# Patient Record
Sex: MALE | Race: OTHER | HISPANIC OR LATINO | Employment: UNEMPLOYED | URBAN - NONMETROPOLITAN AREA
[De-identification: names, ages, dates, MRNs, and addresses within clinical notes are randomized per-mention and may not be internally consistent; named-entity substitution may affect disease eponyms.]

---

## 2018-09-21 ENCOUNTER — HOSPITAL ENCOUNTER (EMERGENCY)
Facility: HOSPITAL | Age: 38
Discharge: HOME | End: 2018-09-21
Attending: EMERGENCY MEDICINE | Admitting: EMERGENCY MEDICINE
Payer: COMMERCIAL

## 2018-09-21 ENCOUNTER — APPOINTMENT (EMERGENCY)
Dept: RADIOLOGY | Facility: HOSPITAL | Age: 38
End: 2018-09-21
Payer: COMMERCIAL

## 2018-09-21 VITALS
OXYGEN SATURATION: 100 % | RESPIRATION RATE: 18 BRPM | SYSTOLIC BLOOD PRESSURE: 137 MMHG | HEART RATE: 72 BPM | DIASTOLIC BLOOD PRESSURE: 94 MMHG | TEMPERATURE: 98.6 F | WEIGHT: 252.43 LBS

## 2018-09-21 DIAGNOSIS — S80.12XA CONTUSION OF LEFT LOWER LEG: Primary | ICD-10-CM

## 2018-09-21 DIAGNOSIS — S81.812A LACERATION OF LEFT LOWER LEG, INITIAL ENCOUNTER: ICD-10-CM

## 2018-09-21 DIAGNOSIS — T07.XXXA MULTIPLE ABRASIONS: ICD-10-CM

## 2018-09-21 PROCEDURE — 99283 EMERGENCY DEPT VISIT LOW MDM: CPT

## 2018-09-21 PROCEDURE — 90715 TDAP VACCINE 7 YRS/> IM: CPT | Performed by: EMERGENCY MEDICINE

## 2018-09-21 PROCEDURE — 90471 IMMUNIZATION ADMIN: CPT

## 2018-09-21 PROCEDURE — 73590 X-RAY EXAM OF LOWER LEG: CPT

## 2018-09-21 RX ORDER — GINSENG 100 MG
1 CAPSULE ORAL 2 TIMES DAILY
Status: DISCONTINUED | OUTPATIENT
Start: 2018-09-21 | End: 2018-09-22 | Stop reason: HOSPADM

## 2018-09-21 RX ORDER — CEPHALEXIN 250 MG/1
1000 CAPSULE ORAL ONCE
Status: COMPLETED | OUTPATIENT
Start: 2018-09-21 | End: 2018-09-21

## 2018-09-21 RX ORDER — CEPHALEXIN 500 MG/1
500 CAPSULE ORAL EVERY 6 HOURS SCHEDULED
Qty: 20 CAPSULE | Refills: 0 | Status: SHIPPED | OUTPATIENT
Start: 2018-09-21 | End: 2018-09-26

## 2018-09-21 RX ORDER — LIDOCAINE HYDROCHLORIDE 10 MG/ML
5 INJECTION, SOLUTION EPIDURAL; INFILTRATION; INTRACAUDAL; PERINEURAL ONCE
Status: COMPLETED | OUTPATIENT
Start: 2018-09-21 | End: 2018-09-21

## 2018-09-21 RX ADMIN — BACITRACIN ZINC 1 SMALL APPLICATION: 500 OINTMENT TOPICAL at 22:31

## 2018-09-21 RX ADMIN — TETANUS TOXOID, REDUCED DIPHTHERIA TOXOID AND ACELLULAR PERTUSSIS VACCINE, ADSORBED 0.5 ML: 5; 2.5; 8; 8; 2.5 SUSPENSION INTRAMUSCULAR at 21:41

## 2018-09-21 RX ADMIN — LIDOCAINE HYDROCHLORIDE 5 ML: 10 INJECTION, SOLUTION EPIDURAL; INFILTRATION; INTRACAUDAL; PERINEURAL at 21:41

## 2018-09-21 RX ADMIN — CEPHALEXIN 1000 MG: 250 CAPSULE ORAL at 21:40

## 2018-09-22 NOTE — DISCHARGE INSTRUCTIONS
ELEVATE LEG NEXT 24 HRS AS MUCH AS POSSIBLE  KEEP WOUNDS CLEAN AND DRY  USE MOTRIN AS NEEDED FOR PAIN  IF ANY SIGN OF INFECTION BE SEEN RIGHT AWAY- RETURN 10 DAYS FOR SUTURE REMOVAL        Contusion in Adults   WHAT YOU NEED TO KNOW:   A contusion is a bruise that appears on your skin after an injury  A bruise happens when small blood vessels tear but skin does not  When blood vessels tear, blood leaks into nearby tissue, such as soft tissue or muscle  DISCHARGE INSTRUCTIONS:   Seek care immediately if:   · You have new trouble moving the injured area  · You have tingling or numbness in or near the injured area  · Your hand or foot below the bruise gets cold or turns pale  Contact your healthcare provider if:   · You find a new lump in the injured area  · Your symptoms do not improve with treatment after 4 to 5 days  · You have questions or concerns about your condition or care  Medicines: You may need any of the following:  · NSAIDs , such as ibuprofen, help decrease swelling, pain, and fever  This medicine is available with or without a doctor's order  NSAIDs can cause stomach bleeding or kidney problems in certain people  If you take blood thinner medicine, always ask your healthcare provider if NSAIDs are safe for you  Always read the medicine label and follow directions  · Prescription pain medicine  may be given  Do not wait until the pain is severe before you take your medicine  · Take your medicine as directed  Contact your healthcare provider if you think your medicine is not helping or if you have side effects  Tell him or her if you are allergic to any medicine  Keep a list of the medicines, vitamins, and herbs you take  Include the amounts, and when and why you take them  Bring the list or the pill bottles to follow-up visits  Carry your medicine list with you in case of an emergency  Follow up with your healthcare provider as directed:   You may need to return within a week to check your injury again  Write down your questions so you remember to ask them during your visits  Help a contusion heal:   · Rest the injured area  or use it less than usual  If you bruised your leg or foot, you may need crutches or a cane to help you walk  This will help you keep weight off your injured body part  · Apply ice  to decrease swelling and pain  Ice may also help prevent tissue damage  Use an ice pack, or put crushed ice in a plastic bag  Cover it with a towel and place it on your bruise for 15 to 20 minutes every hour or as directed  · Use compression  to support the area and decrease swelling  Wrap an elastic bandage around the area over the bruised muscle  Make sure the bandage is not too tight  You should be able to fit 1 finger between the bandage and your skin  · Elevate (raise) your injured body part  above the level of your heart to help decrease pain and swelling  Use pillows, blankets, or rolled towels to elevate the area as often as you can  · Do not drink alcohol  as directed  Alcohol may slow healing  · Do not stretch injured muscles  right after your injury  Ask your healthcare provider when and how you may safely stretch after your injury  Gentle stretches can help increase your flexibility  · Do not massage the area or put heating pads  on the bruise right after your injury  Heat and massage may slow healing  Your healthcare provider may tell you to apply heat after several days  At that time, heat will start to help the injury heal   Prevent another contusion:   · Stretch and warm up before you play sports or exercise  · Wear protective gear when you play sports  Examples are shin guards and padding  · If you begin a new physical activity, start slowly to give your body a chance to adjust   © 2017 [employer] Information is for End User's use only and may not be sold, redistributed or otherwise used for commercial purposes   All illustrations and images included in CareNotes® are the copyrighted property of [employer]  or Billy Johnson  The above information is an  only  It is not intended as medical advice for individual conditions or treatments  Talk to your doctor, nurse or pharmacist before following any medical regimen to see if it is safe and effective for you  Laceration   WHAT YOU NEED TO KNOW:   A laceration is an injury to the skin and the soft tissue underneath it  Lacerations happen when you are cut or hit by something  They can happen anywhere on the body  DISCHARGE INSTRUCTIONS:   Seek care immediately if:   · You have heavy bleeding or bleeding that does not stop after 10 minutes of holding firm, direct pressure over the wound  · Your wound opens up  Contact your healthcare provider if:   · You have a fever or chills  · Your laceration is red, warm, or swollen  · You have red streaks on your skin coming from your wound  · You have white or yellow drainage from the wound that smells bad  · You have pain that gets worse, even after treatment  · You have questions or concerns about your condition or care  Medicines:   · Prescription pain medicine  may be given  Ask how to take this medicine safely  · Antibiotics  help treat or prevent a bacterial infection  · Take your medicine as directed  Contact your healthcare provider if you think your medicine is not helping or if you have side effects  Tell him or her if you are allergic to any medicine  Keep a list of the medicines, vitamins, and herbs you take  Include the amounts, and when and why you take them  Bring the list or the pill bottles to follow-up visits  Carry your medicine list with you in case of an emergency  Care for your wound as directed:   · Do not get your wound wet  until your healthcare provider says it is okay  Do not soak your wound in water   Do not go swimming until your healthcare provider says it is okay  Carefully wash the wound with soap and water  Gently pat the area dry or allow it to air dry  · Change your bandages  when they get wet, dirty, or after washing  Apply new, clean bandages as directed  Do not apply elastic bandages or tape too tight  Do not put powders or lotions over your incision  · Apply antibiotic ointment as directed  Your healthcare provider may give you antibiotic ointment to put over your wound if you have stitches  If you have strips of tape over your incision, let them dry up and fall off on their own  If they do not fall off within 14 days, gently remove them  If you have glue over your wound, do not remove or pick at it  If your glue comes off, do not replace it with glue that you have at home  · Check your wound every day for signs of infection such as swelling, redness, or pus  Self-care:   · Apply ice  on your wound for 15 to 20 minutes every hour or as directed  Use an ice pack, or put crushed ice in a plastic bag  Cover it with a towel  Ice helps prevent tissue damage and decreases swelling and pain  · Use a splint as directed  A splint will decrease movement and stress on your wound  It may help it heal faster  A splint may be used for lacerations over joints or areas of your body that bend  Ask your healthcare provider how to apply and remove a splint  · Decrease scarring of your wound  by applying ointments as directed  Do not apply ointments until your healthcare provider says it is okay  You may need to wait until your wound is healed  Ask which ointment to buy and how often to use it  After your wound is healed, use sunscreen over the area when you are out in the sun  You should do this for at least 6 months to 1 year after your injury  Follow up with your healthcare provider as directed: You will need to return in 3 to 14 days to have stitches or staples removed  Write down your questions so you remember to ask them during your visits    © 2017 [facility] Information is for End User's use only and may not be sold, redistributed or otherwise used for commercial purposes  All illustrations and images included in CareNotes® are the copyrighted property of [employer]  or Billy Johnson  The above information is an  only  It is not intended as medical advice for individual conditions or treatments  Talk to your doctor, nurse or pharmacist before following any medical regimen to see if it is safe and effective for you

## 2018-09-22 NOTE — ED PROVIDER NOTES
History  Chief Complaint   Patient presents with    Leg Injury     Pt was tilling yard and was struck by tiller left lower extremity - c/o pain and bleeding from wounds     Pt was using a tiller in garden and "got to close to his leg"  Blade hit left lower leg  Occurred approx 3:30 PM  Pt washed area after with H2O2 and soap and water   States "made to come to ER by wife"  Pt has pain anterior aspect lower leg and multiple laceration/abrasions  Pt denies numbness/tingling   No other area of injury/pain  History provided by:  Patient  Leg Pain   Location:  Leg  Leg location:  L lower leg  Pain details:     Quality:  Aching and throbbing    Radiates to:  Does not radiate    Timing:  Constant  Chronicity:  New  Tetanus status:  Out of date  Worsened by: Activity  Ineffective treatments:  None tried  Associated symptoms: no back pain, no decreased ROM, no fever, no itching, no muscle weakness, no neck pain, no numbness and no tingling    Risk factors: no concern for non-accidental trauma, no obesity and no recent illness        None       History reviewed  No pertinent past medical history  Past Surgical History:   Procedure Laterality Date    NO PAST SURGERIES         History reviewed  No pertinent family history  I have reviewed and agree with the history as documented  Social History   Substance Use Topics    Smoking status: Current Every Day Smoker     Types: Cigarettes    Smokeless tobacco: Never Used    Alcohol use Yes      Comment: rarely        Review of Systems   Constitutional: Negative for activity change, appetite change, chills, diaphoresis and fever  HENT: Negative  Negative for trouble swallowing and voice change  Eyes: Negative  Respiratory: Negative  Negative for chest tightness and shortness of breath  Cardiovascular: Negative  Negative for chest pain  Gastrointestinal: Negative  Negative for abdominal pain, nausea and vomiting  Genitourinary: Negative  Negative for difficulty urinating, flank pain and hematuria  Musculoskeletal: Negative for back pain, neck pain and neck stiffness  Skin: Positive for wound  Negative for itching  Neurological: Negative  Negative for dizziness, tremors, syncope, speech difficulty, light-headedness, numbness and headaches  Psychiatric/Behavioral: Negative  Negative for confusion, hallucinations and self-injury  The patient is not nervous/anxious  All other systems reviewed and are negative  Physical Exam  Physical Exam   Constitutional: He is oriented to person, place, and time  He appears well-developed and well-nourished  He is active and cooperative  Non-toxic appearance  He does not have a sickly appearance  He does not appear ill  No distress  HENT:   Head: Normocephalic and atraumatic  Mouth/Throat: Oropharynx is clear and moist  Mucous membranes are not dry and not cyanotic  No oropharyngeal exudate, posterior oropharyngeal edema or posterior oropharyngeal erythema  Eyes: Conjunctivae are normal  Pupils are equal, round, and reactive to light  Neck: Normal range of motion and phonation normal  Neck supple  No JVD present  No spinous process tenderness and no muscular tenderness present  Cardiovascular: Normal rate, regular rhythm, intact distal pulses and normal pulses  No extrasystoles are present  Pulmonary/Chest: Effort normal  No respiratory distress  He has no decreased breath sounds  He has no wheezes  He has no rhonchi  He has no rales  He exhibits no tenderness, no crepitus and no swelling  Abdominal: Soft  Bowel sounds are normal  There is no tenderness  There is no rigidity, no guarding and no CVA tenderness     Musculoskeletal:        Right hip: Normal         Left hip: Normal         Right knee: Normal         Left knee: Normal         Right ankle: Normal         Left ankle: Normal         Cervical back: Normal         Thoracic back: Normal         Lumbar back: Normal  Right upper leg: Normal         Left upper leg: Normal         Right lower leg: Normal         Left lower leg: He exhibits tenderness and laceration  He exhibits no deformity  Legs:       Right foot: Normal         Left foot: Normal    Neurological: He is alert and oriented to person, place, and time  He has normal strength and normal reflexes  No cranial nerve deficit  He displays a negative Romberg sign  GCS eye subscore is 4  GCS verbal subscore is 5  GCS motor subscore is 6  Skin: Skin is warm and dry  Abrasion and laceration noted  No petechiae and no rash noted  He is not diaphoretic  No cyanosis  No pallor  Multiple superficial lacerations and on deeper laceration in distal area  ( see pic)  Pt has tenderness of anterior lower leg  No calf tenderness   Psychiatric: He has a normal mood and affect  His speech is normal and behavior is normal  Thought content normal  Cognition and memory are normal    Vitals reviewed  Vital Signs  ED Triage Vitals [09/21/18 2034]   Temperature Pulse Respirations Blood Pressure SpO2   98 6 °F (37 °C) 72 18 137/94 100 %      Temp Source Heart Rate Source Patient Position - Orthostatic VS BP Location FiO2 (%)   Temporal Monitor Sitting Right arm --      Pain Score       4           Vitals:    09/21/18 2034   BP: 137/94   Pulse: 72   Patient Position - Orthostatic VS: Sitting       Visual Acuity      ED Medications  Medications   tetanus-diphtheria-acellular pertussis (BOOSTRIX) IM injection 0 5 mL (0 5 mL Intramuscular Given 9/21/18 2141)   cephalexin (KEFLEX) capsule 1,000 mg (1,000 mg Oral Given 9/21/18 2140)   lidocaine (PF) (XYLOCAINE-MPF) 1 % injection 5 mL (5 mL Infiltration Given by Other 9/21/18 2141)       Diagnostic Studies  Results Reviewed     None                 XR tibia fibula 2 views LEFT   Final Result by Marc Pope MD (09/22 9472)      Tiny amount of soft tissue gas suggested    No foreign body or fracture seen            Workstation performed: [id number]                    Procedures  Lac Repair  Date/Time: 9/21/2018 9:22 PM  Performed by: Kaity Metcalf  Authorized by: Kaity Metcalf   Consent: Verbal consent obtained  Risks and benefits: risks, benefits and alternatives were discussed  Consent given by: patient  Patient understanding: patient states understanding of the procedure being performed  Patient consent: the patient's understanding of the procedure matches consent given  Test results: test results available and properly labeled  Radiology Images displayed and confirmed  If images not available, report reviewed: imaging studies available  Required items: required blood products, implants, devices, and special equipment available  Patient identity confirmed: verbally with patient and arm band  Time out: Immediately prior to procedure a "time out" was called to verify the correct patient, procedure, equipment, support staff and site/side marked as required  Body area: lower extremity  Location details: left lower leg  Laceration length: 2 5 cm  Foreign bodies: no foreign bodies  Tendon involvement: none  Vascular damage: no  Anesthesia: local infiltration    Anesthesia:  Local Anesthetic: lidocaine 1% without epinephrine  Anesthetic total: 3 mL    Sedation:  Patient sedated: no    Wound Dehiscence:  Superficial Wound Dehiscence: simple closure      Procedure Details:  Preparation: Patient was prepped and draped in the usual sterile fashion    Irrigation solution: saline  Debridement: none  Degree of undermining: none  Skin closure: 4-0 nylon  Number of sutures: 2  Technique: simple  Approximation difficulty: simple  Dressing: antibiotic ointment  Patient tolerance: Patient tolerated the procedure well with no immediate complications             Phone Contacts  ED Phone Contact    ED Course                               [facility]  CritCare Time    Disposition  Final diagnoses:   Contusion of left lower leg   Multiple abrasions - left lower leg   Laceration of left lower leg, initial encounter     Time reflects when diagnosis was documented in both MDM as applicable and the Disposition within this note     Time User Action Codes Description Comment    9/21/2018 10:18 PM Calderon Welsh Add [S80 12XA] Contusion of left lower leg     9/21/2018 10:18 PM Calderon Welsh Add [T07  XXXA] Multiple abrasions     9/21/2018 10:19 PM Calderon Welsh Modify [T07  XXXA] Multiple abrasions left lower leg    9/21/2018 10:19 PM Calderon Welsh Add [N72 571T] Laceration of left lower leg, initial encounter       ED Disposition     ED Disposition Condition Comment    Discharge  [facility] discharge to home/self care  Condition at discharge: Stable        Follow-up Information     Follow up With Specialties Details Why Contact Info Additional Information    [facility] Emergency Department Emergency Medicine   [place] [id number] [phone] [place] ED, [address], [address]          There are no discharge medications for this patient  No discharge procedures on file      ED Provider  Electronically Signed by           Adeel Bruno DO  09/22/18 1802

## 2019-04-01 ENCOUNTER — OFFICE VISIT (OUTPATIENT)
Dept: FAMILY MEDICINE CLINIC | Facility: CLINIC | Age: 39
End: 2019-04-01
Payer: COMMERCIAL

## 2019-04-01 VITALS
HEIGHT: 70 IN | SYSTOLIC BLOOD PRESSURE: 128 MMHG | WEIGHT: 270 LBS | BODY MASS INDEX: 38.65 KG/M2 | DIASTOLIC BLOOD PRESSURE: 78 MMHG

## 2019-04-01 DIAGNOSIS — Z02.4 DRIVER'S PERMIT PE (PHYSICAL EXAMINATION): ICD-10-CM

## 2019-04-01 DIAGNOSIS — E66.01 SEVERE OBESITY (BMI 35.0-39.9) WITH COMORBIDITY (HCC): ICD-10-CM

## 2019-04-01 DIAGNOSIS — Z00.00 WELL ADULT EXAM: Primary | ICD-10-CM

## 2019-04-01 PROCEDURE — 99385 PREV VISIT NEW AGE 18-39: CPT | Performed by: PHYSICIAN ASSISTANT

## 2019-06-11 NOTE — ED NOTES
Irrigated and cleansed patient's wound with sterile water and gauze   Tolerated well      Lesa Bourne RN  09/21/18 3037 No

## 2021-06-09 ENCOUNTER — HOSPITAL ENCOUNTER (EMERGENCY)
Facility: HOSPITAL | Age: 41
Discharge: HOME | End: 2021-06-09
Attending: EMERGENCY MEDICINE | Admitting: EMERGENCY MEDICINE
Payer: COMMERCIAL

## 2021-06-09 VITALS
HEIGHT: 70 IN | WEIGHT: 285.5 LBS | BODY MASS INDEX: 40.87 KG/M2 | SYSTOLIC BLOOD PRESSURE: 146 MMHG | DIASTOLIC BLOOD PRESSURE: 84 MMHG | TEMPERATURE: 98.3 F | RESPIRATION RATE: 18 BRPM | OXYGEN SATURATION: 97 % | HEART RATE: 83 BPM

## 2021-06-09 DIAGNOSIS — L02.91 ABSCESS: Primary | ICD-10-CM

## 2021-06-09 PROCEDURE — 87205 SMEAR GRAM STAIN: CPT | Performed by: EMERGENCY MEDICINE

## 2021-06-09 PROCEDURE — 99284 EMERGENCY DEPT VISIT MOD MDM: CPT | Performed by: EMERGENCY MEDICINE

## 2021-06-09 PROCEDURE — 99283 EMERGENCY DEPT VISIT LOW MDM: CPT

## 2021-06-09 PROCEDURE — 10060 I&D ABSCESS SIMPLE/SINGLE: CPT | Performed by: EMERGENCY MEDICINE

## 2021-06-09 PROCEDURE — 87070 CULTURE OTHR SPECIMN AEROBIC: CPT | Performed by: EMERGENCY MEDICINE

## 2021-06-09 PROCEDURE — 96372 THER/PROPH/DIAG INJ SC/IM: CPT

## 2021-06-09 RX ORDER — LIDOCAINE HYDROCHLORIDE 10 MG/ML
5 INJECTION, SOLUTION EPIDURAL; INFILTRATION; INTRACAUDAL; PERINEURAL ONCE
Status: COMPLETED | OUTPATIENT
Start: 2021-06-09 | End: 2021-06-09

## 2021-06-09 RX ORDER — GINSENG 100 MG
1 CAPSULE ORAL ONCE
Status: COMPLETED | OUTPATIENT
Start: 2021-06-09 | End: 2021-06-09

## 2021-06-09 RX ORDER — SULFAMETHOXAZOLE AND TRIMETHOPRIM 800; 160 MG/1; MG/1
1 TABLET ORAL ONCE
Status: COMPLETED | OUTPATIENT
Start: 2021-06-09 | End: 2021-06-09

## 2021-06-09 RX ORDER — SULFAMETHOXAZOLE AND TRIMETHOPRIM 800; 160 MG/1; MG/1
1 TABLET ORAL 2 TIMES DAILY
Qty: 14 TABLET | Refills: 0 | Status: SHIPPED | OUTPATIENT
Start: 2021-06-09 | End: 2021-06-16

## 2021-06-09 RX ORDER — ACETAMINOPHEN 325 MG/1
650 TABLET ORAL ONCE
Status: COMPLETED | OUTPATIENT
Start: 2021-06-09 | End: 2021-06-09

## 2021-06-09 RX ORDER — KETOROLAC TROMETHAMINE 30 MG/ML
15 INJECTION, SOLUTION INTRAMUSCULAR; INTRAVENOUS ONCE
Status: COMPLETED | OUTPATIENT
Start: 2021-06-09 | End: 2021-06-09

## 2021-06-09 RX ADMIN — BACITRACIN 1 SMALL APPLICATION: 500 OINTMENT TOPICAL at 20:19

## 2021-06-09 RX ADMIN — KETOROLAC TROMETHAMINE 15 MG: 30 INJECTION, SOLUTION INTRAMUSCULAR; INTRAVENOUS at 20:18

## 2021-06-09 RX ADMIN — SULFAMETHOXAZOLE AND TRIMETHOPRIM 1 TABLET: 800; 160 TABLET ORAL at 20:19

## 2021-06-09 RX ADMIN — LIDOCAINE HYDROCHLORIDE 5 ML: 10 INJECTION, SOLUTION EPIDURAL; INFILTRATION; INTRACAUDAL; PERINEURAL at 19:57

## 2021-06-09 RX ADMIN — ACETAMINOPHEN 650 MG: 325 TABLET, FILM COATED ORAL at 20:18

## 2021-06-09 NOTE — ED PROVIDER NOTES
History  Chief Complaint   Patient presents with    Ear Problem     pt has a lump on the back of his left ear "for months" states that it recently has gotten larger     HPI  This is a 70-year-old male who comes in with swelling behind his left earlobe  Patient states he had some mild swelling there for a while  Over last couple days though it has started to get worse, it has become acutely swollen, and feels very fluctuant  Patient denies any purulence drainage from the wound  Otherwise denies any neck pain fevers chills sweats or any other concerns  None       History reviewed  No pertinent past medical history  Past Surgical History:   Procedure Laterality Date    NO PAST SURGERIES         Family History   Problem Relation Age of Onset    No Known Problems Mother      I have reviewed and agree with the history as documented  E-Cigarette/Vaping    E-Cigarette Use Never User      E-Cigarette/Vaping Substances     Social History     Tobacco Use    Smoking status: Current Every Day Smoker     Packs/day: 1 00     Types: Cigarettes    Smokeless tobacco: Never Used   Substance Use Topics    Alcohol use: Not Currently     Comment: rarely    Drug use: No       Review of Systems   Constitutional: Negative  Negative for chills and fever  HENT: Negative  Negative for ear pain and sore throat  Eyes: Negative  Negative for pain and discharge  Respiratory: Negative  Negative for chest tightness and shortness of breath  Cardiovascular: Negative  Negative for chest pain and palpitations  Gastrointestinal: Negative  Negative for abdominal pain, nausea and vomiting  Endocrine: Negative  Negative for polyphagia and polyuria  Genitourinary: Negative  Negative for dysuria and flank pain  Musculoskeletal: Negative  Negative for arthralgias and back pain  Skin: Positive for wound  Negative for color change  Allergic/Immunologic: Negative    Negative for food allergies and immunocompromised state  Neurological: Negative  Negative for weakness and headaches  Hematological: Negative  Negative for adenopathy  Does not bruise/bleed easily  Psychiatric/Behavioral: Negative  Negative for suicidal ideas  The patient is not nervous/anxious  Physical Exam  Physical Exam  Vitals signs and nursing note reviewed  Constitutional:       General: He is not in acute distress  Appearance: Normal appearance  He is not diaphoretic  HENT:      Head: Normocephalic and atraumatic  Right Ear: External ear normal       Left Ear: External ear normal       Nose: Nose normal  No congestion or rhinorrhea  Mouth/Throat:      Mouth: Mucous membranes are moist    Eyes:      General: No scleral icterus  Right eye: No discharge  Left eye: No discharge  Conjunctiva/sclera: Conjunctivae normal       Pupils: Pupils are equal, round, and reactive to light  Neck:      Musculoskeletal: Normal range of motion and neck supple  No neck rigidity  Cardiovascular:      Rate and Rhythm: Regular rhythm  Pulses: Normal pulses  Heart sounds: Normal heart sounds  Pulmonary:      Effort: Pulmonary effort is normal  No respiratory distress  Breath sounds: Normal breath sounds  No stridor  No wheezing, rhonchi or rales  Abdominal:      General: Abdomen is flat  There is no distension  Palpations: Abdomen is soft  Tenderness: There is no abdominal tenderness  There is no guarding  Musculoskeletal: Normal range of motion  General: No swelling, tenderness or deformity  Skin:     General: Skin is warm and dry  Capillary Refill: Capillary refill takes less than 2 seconds  Findings: No lesion or rash  Comments: Behind patient's left ear he does have swelling that is very fluctuant mildly tender to palpation  There is no obvious redness around it  Neurological:      General: No focal deficit present        Mental Status: He is alert and oriented to person, place, and time  Cranial Nerves: No cranial nerve deficit  Sensory: No sensory deficit  Psychiatric:         Mood and Affect: Mood normal          Behavior: Behavior normal          Thought Content: Thought content normal          Vital Signs  ED Triage Vitals [06/09/21 1944]   Temperature Pulse Respirations Blood Pressure SpO2   98 3 °F (36 8 °C) 83 18 146/84 97 %      Temp Source Heart Rate Source Patient Position - Orthostatic VS BP Location FiO2 (%)   Temporal Monitor Sitting Right arm --      Pain Score       5           Vitals:    06/09/21 1944   BP: 146/84   Pulse: 83   Patient Position - Orthostatic VS: Sitting         Visual Acuity      ED Medications  Medications   lidocaine (PF) (XYLOCAINE-MPF) 1 % injection 5 mL (5 mL Infiltration Given 6/9/21 1957)   bacitracin topical ointment 1 small application (1 small application Topical Given 6/9/21 2019)   sulfamethoxazole-trimethoprim (BACTRIM DS) 800-160 mg per tablet 1 tablet (1 tablet Oral Given 6/9/21 2019)   ketorolac (TORADOL) injection 15 mg (15 mg Intramuscular Given 6/9/21 2018)   acetaminophen (TYLENOL) tablet 650 mg (650 mg Oral Given 6/9/21 2018)       Diagnostic Studies  Results Reviewed     Procedure Component Value Units Date/Time    Wound culture and Gram stain [[id number]]  (Abnormal) Collected: 06/09/21 2024    Lab Status: Preliminary result Specimen: Wound from Head Updated: 06/11/21 1107     Wound Culture Culture results to follow       Gram Stain Result 2+ Polys      Rare Gram positive cocci in clusters      Rare Gram positive rods                 No orders to display              Procedures  Incision and drain    Date/Time: 6/9/2021 8:00 PM  Performed by: Niles Montenegro MD  Authorized by: Niles Montenegro MD   Universal Protocol:  Procedure performed by:  Consent given by: patient  Patient identity confirmed: verbally with patient      Patient location:  ED  Location:     Type:  Abscess    Size:  1 cm    Location:  Head/neck    Head/neck location: Left earlobe  Pre-procedure details:     Skin preparation:  Betadine  Anesthesia (see MAR for exact dosages): Anesthesia method:  Local infiltration    Local anesthetic:  Lidocaine 1% w/o epi  Procedure details:     Complexity:  Simple    Needle aspiration: no      Incision types:  Stab incision    Scalpel blade:  11    Approach:  Puncture    Incision depth:  Subcutaneous    Wound management:  Probed and deloculated    Drainage:  Purulent    Drainage amount: Moderate    Wound treatment:  Wound left open    Packing materials:  None  Post-procedure details:     Patient tolerance of procedure: Tolerated well, no immediate complications             ED Course      patient found have wound behind his left ear  It was very fluctuant touch  Did numb up with lidocaine without epi  Did do wound culture  Will discharge patient on Bactrim  MDM    Disposition  Final diagnoses:   Abscess     Time reflects when diagnosis was documented in both MDM as applicable and the Disposition within this note     Time User Action Codes Description Comment    6/9/2021  8:10 PM Melanie Kumar Add [L02 91] Abscess       ED Disposition     ED Disposition Condition Date/Time Comment    Discharge Stable Wed Jun 9, 2021  8:10 PM [place] [facility] [place] [facility] to home/self care              Follow-up Information     Follow up With Specialties Details Why Contact Info Additional [address],  Family Medicine Call in 1 day follow up being seen in the emergency department [address]  [facility] [address] [facility] Emergency Medicine Go to  As needed, If symptoms worsen [phone]  [facility] Emergency Department, [address], [address]          Discharge Medication List as of 6/9/2021  8:13 PM      START taking these medications    Details   sulfamethoxazole-trimethoprim (BACTRIM DS) 800-160 mg per tablet Take 1 tablet by mouth 2 (two) times a day for 7 days smx-tmp DS (BACTRIM) 800-160 mg tabs (1tab q12 D10), Starting Wed 6/9/2021, Until Wed 6/16/2021, Normal           No discharge procedures on file      PDMP Review     None          ED Provider  Electronically Signed by           Yanna Davidson MD  06/12/21 0115

## 2021-06-12 LAB
BACTERIA WND AEROBE CULT: ABNORMAL
GRAM STN SPEC: ABNORMAL

## 2021-09-20 ENCOUNTER — OFFICE VISIT (OUTPATIENT)
Dept: FAMILY MEDICINE CLINIC | Facility: CLINIC | Age: 41
End: 2021-09-20
Payer: COMMERCIAL

## 2021-09-20 VITALS
TEMPERATURE: 98 F | OXYGEN SATURATION: 97 % | BODY MASS INDEX: 39.51 KG/M2 | HEART RATE: 62 BPM | DIASTOLIC BLOOD PRESSURE: 72 MMHG | SYSTOLIC BLOOD PRESSURE: 130 MMHG | WEIGHT: 276 LBS | HEIGHT: 70 IN

## 2021-09-20 DIAGNOSIS — K42.9 UMBILICAL HERNIA WITHOUT OBSTRUCTION AND WITHOUT GANGRENE: Primary | ICD-10-CM

## 2021-09-20 PROCEDURE — 99213 OFFICE O/P EST LOW 20 MIN: CPT | Performed by: PHYSICIAN ASSISTANT

## 2021-09-20 NOTE — PROGRESS NOTES
Assessment/Plan:    Problem List Items Addressed This Visit     None      Visit Diagnoses     Umbilical hernia without obstruction and without gangrene    -  Primary    Relevant Orders    Ambulatory referral to General Surgery    BMI 39 0-39 9,adult               Diagnoses and all orders for this visit:    Umbilical hernia without obstruction and without gangrene  -     Ambulatory referral to General Surgery; Future    BMI 39 0-39 9,adult        If pain worsens, pt to go to ER  Subjective:      Patient ID: Keven Muller is a 39 y o  male  Gaynel Blaise is here today complaining of abdominal pain x 2 weeks after helping his brother with heavy lifting  The following portions of the patient's history were reviewed and updated as appropriate:   He has no past medical history on file  ,  does not have a problem list on file  ,   has a past surgical history that includes No past surgeries  ,  family history includes No Known Problems in his mother  ,   reports that he has been smoking cigarettes  He has been smoking about 1 00 pack per day  He has never used smokeless tobacco  He reports previous alcohol use  He reports that he does not use drugs  ,  has No Known Allergies     No current outpatient medications on file  No current facility-administered medications for this visit  Review of Systems   Constitutional: Negative for activity change, appetite change, chills, diaphoresis, fatigue, fever and unexpected weight change  HENT: Negative for congestion, ear pain, postnasal drip, rhinorrhea, sinus pressure, sinus pain, sneezing, sore throat, tinnitus and voice change  Eyes: Negative for pain, redness and visual disturbance  Respiratory: Negative for cough, chest tightness, shortness of breath and wheezing  Cardiovascular: Negative for chest pain, palpitations and leg swelling  Gastrointestinal: Positive for abdominal pain   Negative for blood in stool, constipation, diarrhea, nausea and vomiting  Genitourinary: Negative for difficulty urinating, dysuria, frequency, hematuria and urgency  Musculoskeletal: Negative for arthralgias, back pain, gait problem, joint swelling, myalgias, neck pain and neck stiffness  Skin: Negative for color change, pallor, rash and wound  Neurological: Negative for dizziness, tremors, weakness, light-headedness and headaches  Psychiatric/Behavioral: Negative for dysphoric mood, self-injury, sleep disturbance and suicidal ideas  The patient is not nervous/anxious  Objective:  Vitals:    09/20/21 1659   BP: 130/72   Pulse: 62   Temp: 98 °F (36 7 °C)   SpO2: 97%   Weight: 125 kg (276 lb)   Height: 5' 10" (1 778 m)     Body mass index is 39 6 kg/m²  Physical Exam  Vitals reviewed  Constitutional:       General: He is not in acute distress  Appearance: He is well-developed  He is not diaphoretic  HENT:      Head: Normocephalic and atraumatic  Right Ear: Hearing, tympanic membrane, ear canal and external ear normal       Left Ear: Hearing, tympanic membrane, ear canal and external ear normal       Mouth/Throat:      Pharynx: Uvula midline  No oropharyngeal exudate  Eyes:      General: No scleral icterus  Right eye: No discharge  Left eye: No discharge  Conjunctiva/sclera: Conjunctivae normal    Neck:      Thyroid: No thyromegaly  Vascular: No carotid bruit  Cardiovascular:      Rate and Rhythm: Normal rate and regular rhythm  Heart sounds: Normal heart sounds  No murmur heard  Pulmonary:      Effort: Pulmonary effort is normal  No respiratory distress  Breath sounds: Normal breath sounds  No wheezing  Abdominal:      General: Bowel sounds are normal  There is no distension  Palpations: Abdomen is soft  There is no mass  Tenderness: There is no abdominal tenderness  There is no guarding or rebound  Hernia: A hernia is present  Hernia is present in the umbilical area  Musculoskeletal:         General: No tenderness  Normal range of motion  Cervical back: Neck supple  Lymphadenopathy:      Cervical: No cervical adenopathy  Skin:     General: Skin is warm and dry  Findings: No erythema or rash  Neurological:      Mental Status: He is alert and oriented to person, place, and time  Psychiatric:         Behavior: Behavior normal          Thought Content: Thought content normal          Judgment: Judgment normal              BMI Counseling: Body mass index is 39 6 kg/m²  The BMI is above normal  Nutrition recommendations include reducing portion sizes, decreasing overall calorie intake, 3-5 servings of fruits/vegetables daily, reducing fast food intake, consuming healthier snacks, decreasing soda and/or juice intake, moderation in carbohydrate intake, increasing intake of lean protein, reducing intake of saturated fat and trans fat and reducing intake of cholesterol  Exercise recommendations include exercising 3-5 times per week

## 2021-09-29 ENCOUNTER — CONSULT (OUTPATIENT)
Dept: SURGERY | Facility: CLINIC | Age: 41
End: 2021-09-29
Payer: COMMERCIAL

## 2021-09-29 VITALS
SYSTOLIC BLOOD PRESSURE: 137 MMHG | HEIGHT: 70 IN | TEMPERATURE: 98.8 F | BODY MASS INDEX: 39.08 KG/M2 | WEIGHT: 273 LBS | DIASTOLIC BLOOD PRESSURE: 69 MMHG | HEART RATE: 64 BPM

## 2021-09-29 DIAGNOSIS — K42.9 UMBILICAL HERNIA WITHOUT OBSTRUCTION AND WITHOUT GANGRENE: Primary | ICD-10-CM

## 2021-09-29 PROCEDURE — 99244 OFF/OP CNSLTJ NEW/EST MOD 40: CPT | Performed by: SURGERY

## 2021-09-29 RX ORDER — CHLORHEXIDINE GLUCONATE 4 G/100ML
SOLUTION TOPICAL DAILY PRN
Status: CANCELLED | OUTPATIENT
Start: 2022-01-07

## 2021-09-29 RX ORDER — HEPARIN SODIUM 5000 [USP'U]/ML
5000 INJECTION, SOLUTION INTRAVENOUS; SUBCUTANEOUS ONCE
Status: CANCELLED | OUTPATIENT
Start: 2022-01-07 | End: 2021-09-29

## 2021-09-29 RX ORDER — SODIUM CHLORIDE, SODIUM LACTATE, POTASSIUM CHLORIDE, CALCIUM CHLORIDE 600; 310; 30; 20 MG/100ML; MG/100ML; MG/100ML; MG/100ML
125 INJECTION, SOLUTION INTRAVENOUS CONTINUOUS
Status: CANCELLED | OUTPATIENT
Start: 2022-01-07

## 2021-09-29 NOTE — H&P
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 today in consultation for evaluation of an umbilical hernia  Patient was recently seen by his PCP and that point time was found to have what it was felt to be umbilical hernia  Patient does notice a small bulge there  He states that he does chronic discomfort at the umbilicus  This is made worse with increased activity  He describes the pain is more of a discomfort or an ache  Denies any nausea vomiting  No fevers chills  This pain/ache does sometimes radiate down to the lower abdomen  No overlying skin changes  No changes in bowel bladder habits  Tolerates regular diet  The following portions of the patient's history were reviewed and updated as appropriate:   He  has no past medical history on file  He   Patient Active Problem List    Diagnosis Date Noted    Umbilical hernia without obstruction and without gangrene 09/29/2021     He  has a past surgical history that includes No past surgeries  His family history includes No Known Problems in his mother  He  reports that he has been smoking cigarettes  He has been smoking about 1 00 pack per day  He has never used smokeless tobacco  He reports previous alcohol use  He reports that he does not use drugs  No current outpatient medications on file  No current facility-administered medications for this visit  He has No Known Allergies       Review of Systems      Review systems completed, all negative except as noted above HPI      Objective:      /69   Pulse 64   Temp 98 8 °F (37 1 °C)   Ht 5' 10" (1 778 m)   Wt 124 kg (273 lb)   BMI 39 17 kg/m² Physical Exam  Vitals reviewed  Constitutional:       General: He is not in acute distress  Appearance: Normal appearance  He is not ill-appearing, toxic-appearing or diaphoretic  HENT:      Head: Normocephalic and atraumatic  Right Ear: External ear normal       Left Ear: External ear normal    Eyes:      General: No scleral icterus  Right eye: No discharge  Left eye: No discharge  Cardiovascular:      Rate and Rhythm: Normal rate and regular rhythm  Heart sounds: Normal heart sounds  No murmur heard  No gallop  Pulmonary:      Effort: Pulmonary effort is normal  No respiratory distress  Breath sounds: Normal breath sounds  No stridor  No wheezing or rhonchi  Abdominal:      General: Abdomen is flat  There is no distension  Palpations: Abdomen is soft  Tenderness: There is abdominal tenderness ( tender to palpation at the umbilicus)  Hernia: A hernia (Umbilical partially reducible) is present  Musculoskeletal:         General: No swelling or deformity  Normal range of motion  Cervical back: Normal range of motion and neck supple  Left lower leg: No edema  Skin:     General: Skin is warm and dry  Coloration: Skin is not jaundiced or pale  Findings: No bruising or erythema  Neurological:      General: No focal deficit present  Mental Status: He is alert and oriented to person, place, and time  Cranial Nerves: No cranial nerve deficit  Psychiatric:         Mood and Affect: Mood normal          Behavior: Behavior normal          Thought Content:  Thought content normal          Judgment: Judgment normal

## 2021-09-29 NOTE — H&P
Assessment/Plan:    Umbilical hernia without obstruction and without gangrene  Symptomatic umbilical hernia  Patient wished to have this repaired  Think this is reasonable  However due to logistics patient is unable to undergo surgery until January  Signs and symptoms of strangulation incarceration were discussed  For now he will be booked though for umbilical hernia pair with possible mesh  The procedure self including all associated risks and benefits were discussed the patient great length  The patient verbalized understands risks is willing proceed, consent was signed  No additional preoperative workup needed       Diagnoses and all orders for this visit:    Umbilical hernia without obstruction and without gangrene  -     Ambulatory referral to General Surgery          Subjective:      Patient ID: Nai Garcia is a 39 y o  male  44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 today in consultation for evaluation of an umbilical hernia  Patient was recently seen by his PCP and that point time was found to have what it was felt to be umbilical hernia  Patient does notice a small bulge there  He states that he does chronic discomfort at the umbilicus  This is made worse with increased activity  He describes the pain is more of a discomfort or an ache  Denies any nausea vomiting  No fevers chills  This pain/ache does sometimes radiate down to the lower abdomen  No overlying skin changes  No changes in bowel bladder habits  Tolerates regular diet  The following portions of the patient's history were reviewed and updated as appropriate:   He  has no past medical history on file  He   Patient Active Problem List    Diagnosis Date Noted    Umbilical hernia without obstruction and without gangrene 09/29/2021     He  has a past surgical history that includes No past surgeries  His family history includes No Known Problems in his mother    He  reports that he has been smoking cigarettes  He has been smoking about 1 00 pack per day  He has never used smokeless tobacco  He reports previous alcohol use  He reports that he does not use drugs  No current outpatient medications on file  No current facility-administered medications for this visit  He has No Known Allergies       Review of Systems      Review systems completed, all negative except as noted above HPI  Objective:      /69   Pulse 64   Temp 98 8 °F (37 1 °C)   Ht 5' 10" (1 778 m)   Wt 124 kg (273 lb)   BMI 39 17 kg/m²          Physical Exam  Vitals reviewed  Constitutional:       General: He is not in acute distress  Appearance: Normal appearance  He is not ill-appearing, toxic-appearing or diaphoretic  HENT:      Head: Normocephalic and atraumatic  Right Ear: External ear normal       Left Ear: External ear normal    Eyes:      General: No scleral icterus  Right eye: No discharge  Left eye: No discharge  Cardiovascular:      Rate and Rhythm: Normal rate and regular rhythm  Heart sounds: Normal heart sounds  No murmur heard  No gallop  Pulmonary:      Effort: Pulmonary effort is normal  No respiratory distress  Breath sounds: Normal breath sounds  No stridor  No wheezing or rhonchi  Abdominal:      General: Abdomen is flat  There is no distension  Palpations: Abdomen is soft  Tenderness: There is abdominal tenderness ( tender to palpation at the umbilicus)  Hernia: A hernia (Umbilical partially reducible) is present  Musculoskeletal:         General: No swelling or deformity  Normal range of motion  Cervical back: Normal range of motion and neck supple  Left lower leg: No edema  Skin:     General: Skin is warm and dry  Coloration: Skin is not jaundiced or pale  Findings: No bruising or erythema  Neurological:      General: No focal deficit present        Mental Status: He is alert and oriented to person, place, and time       Cranial Nerves: No cranial nerve deficit  Psychiatric:         Mood and Affect: Mood normal          Behavior: Behavior normal          Thought Content:  Thought content normal          Judgment: Judgment normal

## 2021-09-29 NOTE — PROGRESS NOTES
Assessment/Plan:    Umbilical hernia without obstruction and without gangrene  Symptomatic umbilical hernia  Patient wished to have this repaired  Think this is reasonable  However due to logistics patient is unable to undergo surgery until January  Signs and symptoms of strangulation incarceration were discussed  For now he will be booked though for umbilical hernia pair with possible mesh  The procedure self including all associated risks and benefits were discussed the patient great length  The patient verbalized understands risks is willing proceed, consent was signed  No additional preoperative workup needed       Diagnoses and all orders for this visit:    Umbilical hernia without obstruction and without gangrene  -     Ambulatory referral to General Surgery          Subjective:      Patient ID: Delmy Seay is a 39 y o  male  42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 today in consultation for evaluation of an umbilical hernia  Patient was recently seen by his PCP and that point time was found to have what it was felt to be umbilical hernia  Patient does notice a small bulge there  He states that he does chronic discomfort at the umbilicus  This is made worse with increased activity  He describes the pain is more of a discomfort or an ache  Denies any nausea vomiting  No fevers chills  This pain/ache does sometimes radiate down to the lower abdomen  No overlying skin changes  No changes in bowel bladder habits  Tolerates regular diet  The following portions of the patient's history were reviewed and updated as appropriate:   He  has no past medical history on file  He   Patient Active Problem List    Diagnosis Date Noted    Umbilical hernia without obstruction and without gangrene 09/29/2021     He  has a past surgical history that includes No past surgeries  His family history includes No Known Problems in his mother    He  reports that he has been smoking cigarettes  He has been smoking about 1 00 pack per day  He has never used smokeless tobacco  He reports previous alcohol use  He reports that he does not use drugs  No current outpatient medications on file  No current facility-administered medications for this visit  He has No Known Allergies       Review of Systems      Review systems completed, all negative except as noted above HPI  Objective:      /69   Pulse 64   Temp 98 8 °F (37 1 °C)   Ht 5' 10" (1 778 m)   Wt 124 kg (273 lb)   BMI 39 17 kg/m²          Physical Exam  Vitals reviewed  Constitutional:       General: He is not in acute distress  Appearance: Normal appearance  He is not ill-appearing, toxic-appearing or diaphoretic  HENT:      Head: Normocephalic and atraumatic  Right Ear: External ear normal       Left Ear: External ear normal    Eyes:      General: No scleral icterus  Right eye: No discharge  Left eye: No discharge  Cardiovascular:      Rate and Rhythm: Normal rate and regular rhythm  Heart sounds: Normal heart sounds  No murmur heard  No gallop  Pulmonary:      Effort: Pulmonary effort is normal  No respiratory distress  Breath sounds: Normal breath sounds  No stridor  No wheezing or rhonchi  Abdominal:      General: Abdomen is flat  There is no distension  Palpations: Abdomen is soft  Tenderness: There is abdominal tenderness ( tender to palpation at the umbilicus)  Hernia: A hernia (Umbilical partially reducible) is present  Musculoskeletal:         General: No swelling or deformity  Normal range of motion  Cervical back: Normal range of motion and neck supple  Left lower leg: No edema  Skin:     General: Skin is warm and dry  Coloration: Skin is not jaundiced or pale  Findings: No bruising or erythema  Neurological:      General: No focal deficit present        Mental Status: He is alert and oriented to person, place, and time       Cranial Nerves: No cranial nerve deficit  Psychiatric:         Mood and Affect: Mood normal          Behavior: Behavior normal          Thought Content:  Thought content normal          Judgment: Judgment normal

## 2021-09-29 NOTE — ASSESSMENT & PLAN NOTE
Symptomatic umbilical hernia  Patient wished to have this repaired  Think this is reasonable  However due to logistics patient is unable to undergo surgery until January  Signs and symptoms of strangulation incarceration were discussed  For now he will be booked though for umbilical hernia pair with possible mesh  The procedure self including all associated risks and benefits were discussed the patient great length  The patient verbalized understands risks is willing proceed, consent was signed    No additional preoperative workup needed

## 2021-12-29 ENCOUNTER — OFFICE VISIT (OUTPATIENT)
Dept: SURGERY | Facility: CLINIC | Age: 41
End: 2021-12-29
Payer: COMMERCIAL

## 2021-12-29 VITALS — HEART RATE: 54 BPM | DIASTOLIC BLOOD PRESSURE: 78 MMHG | SYSTOLIC BLOOD PRESSURE: 126 MMHG | TEMPERATURE: 98.8 F

## 2021-12-29 DIAGNOSIS — K42.9 UMBILICAL HERNIA WITHOUT OBSTRUCTION AND WITHOUT GANGRENE: Primary | ICD-10-CM

## 2021-12-29 PROCEDURE — 99213 OFFICE O/P EST LOW 20 MIN: CPT | Performed by: SURGERY

## 2021-12-30 NOTE — H&P (VIEW-ONLY)
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 with known symptomatic umbilical hernia presents today follow-up  Patient seen for a few months prior with complaints of symptomatic umbilical hernia  Patient wished to have this repaired wanted do so in January  Repeat he returns today for follow-up discussion and repeat H& P   Patient states he is still symptomatic in with selective proceed with surgery  Pain is sharp stabbing and localized the umbilicus  No changes in bowel bladder habits  No nausea vomiting fevers or chills  No changes in cardiopulmonary status with no shortness of breath no chest pain  The following portions of the patient's history were reviewed and updated as appropriate:   He  has no past medical history on file  He   Patient Active Problem List    Diagnosis Date Noted    Umbilical hernia without obstruction and without gangrene 09/29/2021     He  has a past surgical history that includes No past surgeries  His family history includes No Known Problems in his mother  He  reports that he has been smoking cigarettes  He has been smoking about 1 00 pack per day  He has never used smokeless tobacco  He reports previous alcohol use  He reports that he does not use drugs  No current outpatient medications on file  No current facility-administered medications for this visit  He has No Known Allergies       Review of Systems      Review systems completed, all negative except as noted above HPI  Objective:      /78   Pulse (!) 54   Temp 98 8 °F (37 1 °C)          Physical Exam  Vitals reviewed  Constitutional:       General: He is not in acute distress  Appearance: Normal appearance  He is obese  He is not ill-appearing, toxic-appearing or diaphoretic  HENT:      Right Ear: External ear normal       Left Ear: External ear normal    Eyes:      General: No scleral icterus  Right eye: No discharge  Left eye: No discharge  Cardiovascular:      Rate and Rhythm: Normal rate and regular rhythm  Heart sounds: Normal heart sounds  No murmur heard  No friction rub  No gallop  Pulmonary:      Effort: Pulmonary effort is normal  No respiratory distress  Breath sounds: Normal breath sounds  No stridor  No wheezing or rhonchi  Abdominal:      General: There is no distension  Palpations: Abdomen is soft  There is no mass  Tenderness: There is abdominal tenderness (At the umbilicus)  Hernia: A hernia ( umbilical incarcerated hernia) is present  Musculoskeletal:         General: No swelling, tenderness, deformity or signs of injury  Normal range of motion  Cervical back: Normal range of motion and neck supple  Skin:     General: Skin is dry  Coloration: Skin is not jaundiced or pale  Findings: No bruising or erythema  Neurological:      General: No focal deficit present  Mental Status: He is alert and oriented to person, place, and time  Cranial Nerves: No cranial nerve deficit  Psychiatric:         Mood and Affect: Mood normal          Behavior: Behavior normal          Thought Content:  Thought content normal          Judgment: Judgment normal

## 2022-01-03 NOTE — PRE-PROCEDURE INSTRUCTIONS
No outpatient medications have been marked as taking for the 1/7/22 encounter [facility] Encounter)  Have you had / have a sore throat? No  Have you had / have a cough less than 1 week? No  Have you had / have a fever greater than 100 0 - 100  4? No  Are you experiencing any shortness of breath? No    Review with patient via phone medications and showering instructions  Advised don't take NSAID's, ok tylenol products  Advised ASC call with surgery schedule time, nothing eat or drink after midnight  Verbalized understanding  Advised Smoking Cessation Education is not interested

## 2022-01-04 ENCOUNTER — ANESTHESIA EVENT (OUTPATIENT)
Dept: PERIOP | Facility: HOSPITAL | Age: 42
End: 2022-01-04
Payer: COMMERCIAL

## 2022-01-07 ENCOUNTER — HOSPITAL ENCOUNTER (OUTPATIENT)
Facility: HOSPITAL | Age: 42
Setting detail: OUTPATIENT SURGERY
Discharge: HOME | End: 2022-01-07
Attending: SURGERY | Admitting: SURGERY
Payer: COMMERCIAL

## 2022-01-07 ENCOUNTER — ANESTHESIA (OUTPATIENT)
Dept: PERIOP | Facility: HOSPITAL | Age: 42
End: 2022-01-07
Payer: COMMERCIAL

## 2022-01-07 VITALS
SYSTOLIC BLOOD PRESSURE: 124 MMHG | TEMPERATURE: 97.2 F | OXYGEN SATURATION: 99 % | RESPIRATION RATE: 18 BRPM | DIASTOLIC BLOOD PRESSURE: 68 MMHG | HEART RATE: 53 BPM

## 2022-01-07 DIAGNOSIS — K42.9 UMBILICAL HERNIA WITHOUT OBSTRUCTION AND WITHOUT GANGRENE: Primary | ICD-10-CM

## 2022-01-07 PROCEDURE — 49587 PR REPAIR UMBILICAL HERN,5+Y/O,STRANG: CPT | Performed by: PHYSICIAN ASSISTANT

## 2022-01-07 PROCEDURE — 49587 PR REPAIR UMBILICAL HERN,5+Y/O,STRANG: CPT | Performed by: SURGERY

## 2022-01-07 RX ORDER — ALBUTEROL SULFATE 90 UG/1
AEROSOL, METERED RESPIRATORY (INHALATION) AS NEEDED
Status: DISCONTINUED | OUTPATIENT
Start: 2022-01-07 | End: 2022-01-07

## 2022-01-07 RX ORDER — FENTANYL CITRATE 50 UG/ML
INJECTION, SOLUTION INTRAMUSCULAR; INTRAVENOUS AS NEEDED
Status: DISCONTINUED | OUTPATIENT
Start: 2022-01-07 | End: 2022-01-07

## 2022-01-07 RX ORDER — OXYCODONE HYDROCHLORIDE AND ACETAMINOPHEN 5; 325 MG/1; MG/1
1 TABLET ORAL EVERY 4 HOURS PRN
Qty: 20 TABLET | Refills: 0 | Status: SHIPPED | OUTPATIENT
Start: 2022-01-07

## 2022-01-07 RX ORDER — MORPHINE SULFATE 10 MG/ML
2 INJECTION, SOLUTION INTRAMUSCULAR; INTRAVENOUS EVERY 4 HOURS PRN
Status: DISCONTINUED | OUTPATIENT
Start: 2022-01-07 | End: 2022-01-07 | Stop reason: HOSPADM

## 2022-01-07 RX ORDER — ONDANSETRON 2 MG/ML
4 INJECTION INTRAMUSCULAR; INTRAVENOUS EVERY 8 HOURS PRN
Status: DISCONTINUED | OUTPATIENT
Start: 2022-01-07 | End: 2022-01-07 | Stop reason: HOSPADM

## 2022-01-07 RX ORDER — ONDANSETRON 2 MG/ML
INJECTION INTRAMUSCULAR; INTRAVENOUS AS NEEDED
Status: DISCONTINUED | OUTPATIENT
Start: 2022-01-07 | End: 2022-01-07

## 2022-01-07 RX ORDER — PROPOFOL 10 MG/ML
INJECTION, EMULSION INTRAVENOUS AS NEEDED
Status: DISCONTINUED | OUTPATIENT
Start: 2022-01-07 | End: 2022-01-07

## 2022-01-07 RX ORDER — HYDROMORPHONE HCL/PF 1 MG/ML
0.5 SYRINGE (ML) INJECTION
Status: DISCONTINUED | OUTPATIENT
Start: 2022-01-07 | End: 2022-01-07 | Stop reason: HOSPADM

## 2022-01-07 RX ORDER — DEXAMETHASONE SODIUM PHOSPHATE 10 MG/ML
INJECTION, SOLUTION INTRAMUSCULAR; INTRAVENOUS AS NEEDED
Status: DISCONTINUED | OUTPATIENT
Start: 2022-01-07 | End: 2022-01-07

## 2022-01-07 RX ORDER — ROCURONIUM BROMIDE 10 MG/ML
INJECTION, SOLUTION INTRAVENOUS AS NEEDED
Status: DISCONTINUED | OUTPATIENT
Start: 2022-01-07 | End: 2022-01-07

## 2022-01-07 RX ORDER — SODIUM CHLORIDE, SODIUM LACTATE, POTASSIUM CHLORIDE, CALCIUM CHLORIDE 600; 310; 30; 20 MG/100ML; MG/100ML; MG/100ML; MG/100ML
125 INJECTION, SOLUTION INTRAVENOUS CONTINUOUS
Status: DISCONTINUED | OUTPATIENT
Start: 2022-01-07 | End: 2022-01-07 | Stop reason: HOSPADM

## 2022-01-07 RX ORDER — CEFAZOLIN SODIUM 2 G/50ML
SOLUTION INTRAVENOUS AS NEEDED
Status: DISCONTINUED | OUTPATIENT
Start: 2022-01-07 | End: 2022-01-07

## 2022-01-07 RX ORDER — ONDANSETRON 2 MG/ML
4 INJECTION INTRAMUSCULAR; INTRAVENOUS ONCE AS NEEDED
Status: DISCONTINUED | OUTPATIENT
Start: 2022-01-07 | End: 2022-01-07 | Stop reason: HOSPADM

## 2022-01-07 RX ORDER — OXYCODONE HYDROCHLORIDE AND ACETAMINOPHEN 5; 325 MG/1; MG/1
2 TABLET ORAL EVERY 4 HOURS PRN
Status: DISCONTINUED | OUTPATIENT
Start: 2022-01-07 | End: 2022-01-07 | Stop reason: HOSPADM

## 2022-01-07 RX ORDER — MAGNESIUM HYDROXIDE 1200 MG/15ML
LIQUID ORAL AS NEEDED
Status: DISCONTINUED | OUTPATIENT
Start: 2022-01-07 | End: 2022-01-07 | Stop reason: HOSPADM

## 2022-01-07 RX ORDER — FENTANYL CITRATE/PF 50 MCG/ML
25 SYRINGE (ML) INJECTION
Status: DISCONTINUED | OUTPATIENT
Start: 2022-01-07 | End: 2022-01-07 | Stop reason: HOSPADM

## 2022-01-07 RX ORDER — MIDAZOLAM HYDROCHLORIDE 2 MG/2ML
INJECTION, SOLUTION INTRAMUSCULAR; INTRAVENOUS AS NEEDED
Status: DISCONTINUED | OUTPATIENT
Start: 2022-01-07 | End: 2022-01-07

## 2022-01-07 RX ORDER — LIDOCAINE HYDROCHLORIDE 10 MG/ML
INJECTION, SOLUTION EPIDURAL; INFILTRATION; INTRACAUDAL; PERINEURAL AS NEEDED
Status: DISCONTINUED | OUTPATIENT
Start: 2022-01-07 | End: 2022-01-07

## 2022-01-07 RX ORDER — LIDOCAINE HYDROCHLORIDE 10 MG/ML
0.5 INJECTION, SOLUTION EPIDURAL; INFILTRATION; INTRACAUDAL; PERINEURAL ONCE AS NEEDED
Status: DISCONTINUED | OUTPATIENT
Start: 2022-01-07 | End: 2022-01-07 | Stop reason: HOSPADM

## 2022-01-07 RX ORDER — HEPARIN SODIUM 5000 [USP'U]/ML
5000 INJECTION, SOLUTION INTRAVENOUS; SUBCUTANEOUS ONCE
Status: COMPLETED | OUTPATIENT
Start: 2022-01-07 | End: 2022-01-07

## 2022-01-07 RX ORDER — SODIUM CHLORIDE, SODIUM LACTATE, POTASSIUM CHLORIDE, CALCIUM CHLORIDE 600; 310; 30; 20 MG/100ML; MG/100ML; MG/100ML; MG/100ML
INJECTION, SOLUTION INTRAVENOUS CONTINUOUS PRN
Status: DISCONTINUED | OUTPATIENT
Start: 2022-01-07 | End: 2022-01-07

## 2022-01-07 RX ORDER — BUPIVACAINE HYDROCHLORIDE 5 MG/ML
INJECTION, SOLUTION PERINEURAL AS NEEDED
Status: DISCONTINUED | OUTPATIENT
Start: 2022-01-07 | End: 2022-01-07 | Stop reason: HOSPADM

## 2022-01-07 RX ORDER — CHLORHEXIDINE GLUCONATE 4 G/100ML
SOLUTION TOPICAL DAILY PRN
Status: DISCONTINUED | OUTPATIENT
Start: 2022-01-07 | End: 2022-01-07 | Stop reason: HOSPADM

## 2022-01-07 RX ADMIN — LIDOCAINE HYDROCHLORIDE 50 MG: 10 INJECTION, SOLUTION EPIDURAL; INFILTRATION; INTRACAUDAL; PERINEURAL at 10:59

## 2022-01-07 RX ADMIN — FENTANYL CITRATE 100 MCG: 50 INJECTION, SOLUTION INTRAMUSCULAR; INTRAVENOUS at 10:59

## 2022-01-07 RX ADMIN — FENTANYL CITRATE 50 MCG: 50 INJECTION, SOLUTION INTRAMUSCULAR; INTRAVENOUS at 11:24

## 2022-01-07 RX ADMIN — FENTANYL CITRATE 25 MCG: 50 INJECTION INTRAMUSCULAR; INTRAVENOUS at 12:12

## 2022-01-07 RX ADMIN — SUGAMMADEX 200 MG: 100 INJECTION, SOLUTION INTRAVENOUS at 11:42

## 2022-01-07 RX ADMIN — ROCURONIUM BROMIDE 40 MG: 50 INJECTION, SOLUTION INTRAVENOUS at 10:59

## 2022-01-07 RX ADMIN — PROPOFOL 300 MG: 10 INJECTION, EMULSION INTRAVENOUS at 10:59

## 2022-01-07 RX ADMIN — FENTANYL CITRATE 50 MCG: 50 INJECTION, SOLUTION INTRAMUSCULAR; INTRAVENOUS at 11:55

## 2022-01-07 RX ADMIN — HEPARIN SODIUM 5000 UNITS: 5000 INJECTION INTRAVENOUS; SUBCUTANEOUS at 10:27

## 2022-01-07 RX ADMIN — SODIUM CHLORIDE, SODIUM LACTATE, POTASSIUM CHLORIDE, AND CALCIUM CHLORIDE 125 ML/HR: .6; .31; .03; .02 INJECTION, SOLUTION INTRAVENOUS at 10:21

## 2022-01-07 RX ADMIN — ALBUTEROL SULFATE 2 PUFF: 90 AEROSOL, METERED RESPIRATORY (INHALATION) at 10:59

## 2022-01-07 RX ADMIN — OXYCODONE HYDROCHLORIDE AND ACETAMINOPHEN 2 TABLET: 5; 325 TABLET ORAL at 13:02

## 2022-01-07 RX ADMIN — SODIUM CHLORIDE, SODIUM LACTATE, POTASSIUM CHLORIDE, AND CALCIUM CHLORIDE: .6; .31; .03; .02 INJECTION, SOLUTION INTRAVENOUS at 10:53

## 2022-01-07 RX ADMIN — CEFAZOLIN SODIUM 3000 MG: 2 SOLUTION INTRAVENOUS at 10:58

## 2022-01-07 RX ADMIN — FENTANYL CITRATE 25 MCG: 50 INJECTION INTRAMUSCULAR; INTRAVENOUS at 12:19

## 2022-01-07 RX ADMIN — MIDAZOLAM HYDROCHLORIDE 2 MG: 1 INJECTION, SOLUTION INTRAMUSCULAR; INTRAVENOUS at 10:55

## 2022-01-07 RX ADMIN — DEXAMETHASONE SODIUM PHOSPHATE 4 MG: 10 INJECTION, SOLUTION INTRAMUSCULAR; INTRAVENOUS at 10:59

## 2022-01-07 RX ADMIN — ONDANSETRON HYDROCHLORIDE 4 MG: 2 INJECTION, SOLUTION INTRAVENOUS at 11:24

## 2022-01-07 NOTE — ANESTHESIA POSTPROCEDURE EVALUATION
Post-Op Assessment Note    CV Status:  Stable  Pain Score: 0    Pain management: adequate     Mental Status:  Alert   Hydration Status:  Stable   PONV Controlled:  None   Airway Patency:  Patent      Post Op Vitals Reviewed: Yes      Staff: CRNA         No complications documented      BP   142/72   Temp  97   Pulse  80   Resp   18   SpO2   97

## 2022-01-07 NOTE — OP NOTE
PERATIVE REPORT  PATIENT NAME: Arabella Flores    :  1980  MRN: [id number]  Pt Location: [facility] OR ROOM 02    SURGERY DATE: 2022    Surgeon(s) and Role: * Fabio Garza,  - Primary     * Jose Duran PA-C - Assisting    Preop Diagnosis:  Umbilical hernia without obstruction and without gangrene [K42 9]    Post-Op Diagnosis Codes:     * Umbilical hernia without obstruction and without gangrene [K42 9]    Procedure(s) (LRB):  REPAIR HERNIA UMBILICAL (N/A)    Specimen(s):  * No specimens in log *    Estimated Blood Loss:   Minimal    Drains:  * No LDAs found *    Anesthesia Type:   General    Operative Indications:  Umbilical hernia without obstruction and without gangrene [K42 9]      Operative Findings:  Incarcerated umbilical hernia containing fat  Hernia measured 1 5 cm and was repaired with #1 PDS suture     Complications:   None    Procedure and Technique:  Patient brought to the operating room and placed in supine position operating table  All regular monitoring devices were connected  The patient underwent general anesthesia with endotracheal patient without complication  The patient received perioperative antibiotics  and bilateral lower sequential compression devices for DVT prophylaxis  Patient was then prepped and draped in usual sterile fashion  Timeout was performed to verify correct patient, procedure, position, and site  A infrraumbilical curvilinearincision was made using a 15 scalpel overlying palpable mass    The incision was carried down through the dermis and subcutaneous fat using cautery  Hemostasis was achieved  The tissues were further dissected down to the fascial layer  The umbilical stalk was then encircled and dissected and then detached from the underlying fascia  Once this occurred a defect in the fascia consistent with an umbilical hernia was found  The peritoneal sac was then opened and was noted to be containing fat   The fat was then dissected out and freed from any adhesions of the surrounding fascia  The fascial edges were then freshened up and any additional attending and was excised  The fascial defect at that point was measured and noted to be 1 5cm  The underside of the fascia was then palpated and inspected and there appeared to be no other subsequent defects  The hernia was then repaired primarily with interrupted #1 Prolene sutures placed in a figure-of-eight fashion  The wound was irrigated once again hemostasis again was achieved  The umbilical stalk was then reapproximated using 2 Vicryl  The dermal tissue was then reapproximated using interrupted 3-0 Vicryl  The skin was then approximated using 4-0 Monocryl  Steri-Strips and dry sterile dressing then applied  The patient tolerated procedure well and was transported back to postanesthesia care unit in stable condition  All lap, needle, and instrument counts were correct       I was present for the entire procedure, A qualified resident physician was not available and A physician assistant was required during the procedure for retraction tissue handling,dissection and suturing    Patient Disposition:  PACU       SIGNATURE: Jeff Tovar DO  DATE: January 7, 2022  TIME: 11:47 AM

## 2022-01-07 NOTE — INTERVAL H&P NOTE
H&P reviewed  After examining the patient I find no changes in the patients condition since the H&P had been written      Vitals:    01/07/22 1006   BP: 115/62   Pulse: 56   Resp: 18   Temp: (!) 97 3 °F (36 3 °C)   SpO2: 96%

## 2022-01-07 NOTE — ANESTHESIA PREPROCEDURE EVALUATION
Procedure:  REPAIR HERNIA UMBILICAL (N/A Abdomen)    Relevant Problems   No relevant active problems   Sleep apnea   BMI-39  Physical Exam    Airway    Mallampati score: II  TM Distance: >3 FB  Neck ROM: full     Dental   Comment: Significant Peridontal disease,     Cardiovascular      Pulmonary  Comment: Rhonchi cleared with coughing, Rhonchi,     Other Findings        Anesthesia Plan  ASA Score- 3     Anesthesia Type- general with ASA Monitors  Additional Monitors:   Airway Plan: ETT  Plan Factors-Exercise tolerance (METS): >4 METS  Chart reviewed  Patient is a current smoker  Induction- intravenous  Postoperative Plan-     Informed Consent- Anesthetic plan and risks discussed with patient and spouse  I personally reviewed this patient with the CRNA  Discussed and agreed on the Anesthesia Plan with the CRNA  Garrett Bender

## 2022-01-07 NOTE — DISCHARGE INSTRUCTIONS
How to Stop Smoking   AMBULATORY CARE:   You will improve your health and the health of others around you  if you stop smoking  Your risk for heart and lung disease, cancer, stroke, heart attack, and vision problems will also decrease  Your adolescent can help prevent or stop harm to his or her brain or body  This will help him or her become a healthy adult  You can benefit from quitting no matter how long you have smoked  Prepare to stop smoking:  Nicotine is a highly addictive drug found in cigarettes  Withdrawal symptoms can happen when you stop smoking and make it hard to quit  These include anxiety, depression, irritability, trouble sleeping, and increased appetite  You increase your chances of success if you prepare to quit  · Set a quit date  Iris Bowers a date that is within the next 2 weeks  Do not pick a day that you think may be stressful or busy  Write down the day or [place] it on your calender  · Tell friends and family that you plan to quit  Explain that you may have withdrawal symptoms when you try to quit  Ask them to support you  They may be able to encourage you and help reduce your stress to make it easier for you to quit  · Make a list of your reasons for quitting  Put the list somewhere you will see it every day, such as your refrigerator  You can look at the list when you have a craving  · Remove all tobacco and nicotine products from your home, car, and workplace  Also, remove anything else that will tempt you to smoke, such as lighters, matches, or ashtrays  Clean your car, home, and places at work that smell like smoke  The smell of smoke can trigger a craving  · Identify triggers that make you want to smoke  This may include activities, feelings, or people  Also write down 1 way you can deal with each of your triggers  For example, if you want to smoke as soon as you wake up, plan another activity during this time, such as exercise  · Make a plan for how you will quit  Learn about the tools that can help you quit, such as medicine, counseling, or nicotine replacement therapy  Choose at least 2 options to help you quit  · Help your adolescent make a plan to quit  The plan will be more successful if your adolescent makes his or her own decisions  Do not try to pressure him or her to quit immediately or in a certain way  Be supportive and offer help if needed  Tools to help you stop smoking:   · Counseling  from a trained healthcare provider can provide you with support and skills to quit smoking  The provider will also teach you to manage your withdrawal symptoms and cravings  You may receive counseling from one counselor, in group therapy, or through phone therapy called a quit line  · Nicotine replacement therapy (NRT)  such as nicotine patches, gum, or lozenges may help reduce your nicotine cravings  You may get these without a doctor's order  Do not use e-cigarettes or smokeless tobacco in place of cigarettes or to help you quit  They still contain nicotine  · Prescription medicines  such as nasal sprays or nicotine inhalers may help reduce your withdrawal symptoms  Other medicines may also be used to reduce your urge to smoke  Ask your healthcare provider about these medicines  You may need to start certain medicines 2 weeks before your quit date for them to work well  · Hypnosis  is a practice that helps guide you through thoughts and feelings  Hypnosis may help decrease your cravings and make you more willing to quit  · Acupuncture therapy  uses very thin needles to balance energy channels in the body  This is thought to help decrease cravings and symptoms of nicotine withdrawal     · Support groups  let you talk to others who are trying to quit or have already quit  It may be helpful to speak with others about how they quit  Manage your cravings:   · Avoid situations, people, and places that tempt you to smoke    Go to nonsmoking places, such as libraries or restaurants  Understand what tempts you and try to avoid these things  · Keep your hands busy  Hold things such as a stress ball or pen  · Put candy or toothpicks in your mouth  Keep lollipops, sugarless gum, or toothpicks with you at all times  · Do not have alcohol or caffeine  These drinks may tempt you to smoke  Drink healthy liquids such as water or juice instead  · Reward yourself when you resist your cravings  Rewards will motivate you and help you stay positive  · Do an activity that distracts you from your craving  Examples include cleaning, creating art, or gardening  Prevent weight gain after you quit:  You may gain a few pounds after you quit smoking  It is healthier for you to gain a few pounds than to continue to smoke  The following can help you prevent weight gain:  · Eat a variety of healthy foods  Healthy foods include fruits, vegetables, whole-grain breads, low-fat dairy products, beans, lean meats, and fish  Eat healthy snacks, such as low-fat yogurt, if you get hungry between meals  · Drink water before, during, and between meals  This will make your stomach feel full and help prevent you from overeating  Ask your healthcare provider how much liquid to drink each day and which liquids are best for you  · Be physically active  Activity may help reduce your cravings and reduce stress  Take a walk or do some kind of physical activity every day  Ask your healthcare provider which activities are right for you  For support and more information:   · [employer]  [address] [facility],[address]  [address]  Phone: [facility]  Phone: 1- 934 - 586-7292  Web Address: Alma Wyman  org    · Smokefree  gov  Phone: 7- 130 - 592-4892  Web Address: www smokefree    [address] 2021 Information is for End User's use only and may not be sold, redistributed or otherwise used for commercial purposes   All illustrations and images included in CareNotes® are the copyrighted property of [employer]  or Leda Gonzalez   The above information is an  only  It is not intended as medical advice for individual conditions or treatments  Talk to your doctor, nurse or pharmacist before following any medical regimen to see if it is safe and effective for you  Follow-up Dr Orbie Severin in 2 weeks as per appointment  Regular diet as  Low intensity activity as tolerated, no heavy lifting, nothing greater than 10 lb for 6 weeks  Dressing may be removed on Sunday  You may shower on Sunday  No baths or swimming  If developed fever, nausea vomiting, worsening pain, redness or drainage from incision call the office or go to the ER

## 2022-01-26 ENCOUNTER — OFFICE VISIT (OUTPATIENT)
Dept: SURGERY | Facility: CLINIC | Age: 42
End: 2022-01-26

## 2022-01-26 VITALS
SYSTOLIC BLOOD PRESSURE: 141 MMHG | WEIGHT: 271 LBS | BODY MASS INDEX: 38.8 KG/M2 | HEIGHT: 70 IN | TEMPERATURE: 98.5 F | HEART RATE: 66 BPM | DIASTOLIC BLOOD PRESSURE: 84 MMHG

## 2022-01-26 DIAGNOSIS — K42.9 UMBILICAL HERNIA WITHOUT OBSTRUCTION AND WITHOUT GANGRENE: Primary | ICD-10-CM

## 2022-01-26 PROCEDURE — 99024 POSTOP FOLLOW-UP VISIT: CPT | Performed by: SURGERY

## 2022-01-26 NOTE — PROGRESS NOTES
Assessment/Plan:    Umbilical hernia without obstruction and without gangrene  Status post umbilical hernia pair doing well  Mild soreness at the site  On exam does have a superficial scab with some mild dehiscence at the skin level  No evidence of infection  Continue to keep the wound clean and covered  Follow-up in 1 month continue no heavy lifting for additional 4 weeks  Diagnoses and all orders for this visit:    Umbilical hernia without obstruction and without gangrene          Subjective:      Patient ID: Martha Bae is a 39 y o  male  36-years-old gentleman status post umbilical hernia pair presents today for postop check  Overall doing well  No nausea vomiting  No fevers chills  Tolerating diet  Minimal abdominal soreness  As per the wife he states states that the incision looks little weird  There appears to be a scab is also the skin appears to be somewhat open  Minimal drainage  No significant tenderness  The following portions of the patient's history were reviewed and updated as appropriate:   He  has a past medical history of Seizures ([address] ) and Sleep apnea  He   Patient Active Problem List    Diagnosis Date Noted    Umbilical hernia without obstruction and without gangrene 09/29/2021     He  has a past surgical history that includes No past surgeries and pr repair umbilical PVGG,8+L/Z,PXULF (N/A, 1/7/2022)  His family history includes Diabetes in his mother; Hypertension in his mother; No Known Problems in his brother, father, sister, and sister  He  reports that he has been smoking cigarettes  He has been smoking about 1 00 pack per day  He has never used smokeless tobacco  He reports previous alcohol use  He reports that he does not use drugs    Current Outpatient Medications   Medication Sig Dispense Refill    oxyCODONE-acetaminophen (PERCOCET) 5-325 mg per tablet Take 1 tablet by mouth every 4 (four) hours as needed for moderate pain or severe pain Max Daily Amount: 6 tablets 20 tablet 0     No current facility-administered medications for this visit  He has No Known Allergies       Review of Systems   Constitutional: Negative for activity change, appetite change, chills, diaphoresis, fatigue, fever and unexpected weight change  Skin: Positive for wound (Umbilical)  Negative for color change, pallor and rash  Objective:      /84   Pulse 66   Temp 98 5 °F (36 9 °C)   Ht 5' 10" (1 778 m)   Wt 123 kg (271 lb)   BMI 38 88 kg/m²          Physical Exam  Vitals reviewed  Constitutional:       General: He is not in acute distress  Appearance: Normal appearance  He is not ill-appearing, toxic-appearing or diaphoretic  Abdominal:      General: There is no distension  Palpations: Abdomen is soft  There is no mass  Tenderness: There is abdominal tenderness  There is no guarding or rebound  Hernia: No hernia is present  Comments: Infraumbilical incision noted to have superficial scab  There is a slight dehiscence at the skin level revealing some healthy granulation tissue  No evidence of any erythema  No evidence of any infection or significant drainage  Neurological:      Mental Status: He is alert

## 2022-01-26 NOTE — ASSESSMENT & PLAN NOTE
Status post umbilical hernia pair doing well  Mild soreness at the site  On exam does have a superficial scab with some mild dehiscence at the skin level  No evidence of infection  Continue to keep the wound clean and covered  Follow-up in 1 month continue no heavy lifting for additional 4 weeks

## 2022-02-23 ENCOUNTER — OFFICE VISIT (OUTPATIENT)
Dept: SURGERY | Facility: CLINIC | Age: 42
End: 2022-02-23

## 2022-02-23 VITALS
WEIGHT: 269 LBS | SYSTOLIC BLOOD PRESSURE: 132 MMHG | HEART RATE: 88 BPM | DIASTOLIC BLOOD PRESSURE: 80 MMHG | TEMPERATURE: 98.5 F | BODY MASS INDEX: 38.51 KG/M2 | HEIGHT: 70 IN

## 2022-02-23 DIAGNOSIS — K42.9 UMBILICAL HERNIA WITHOUT OBSTRUCTION AND WITHOUT GANGRENE: Primary | ICD-10-CM

## 2022-02-23 PROCEDURE — 99024 POSTOP FOLLOW-UP VISIT: CPT | Performed by: SURGERY

## 2022-02-24 NOTE — PROGRESS NOTES
Assessment/Plan:    Umbilical hernia without obstruction and without gangrene  Status post umbilical hernia repair  Doing well  Patient is approximately 6 weeks out 1 not require any additional restrictions  Follow-up p r n  [place] Diagnoses and all orders for this visit:    Umbilical hernia without obstruction and without gangrene          Subjective:      Patient ID: Shirin Andrade is a 39 y o  male  49-year-old gentleman status post umbilical hernia pair presents today for follow-up  Overall doing well  No nausea vomiting fevers chills  Tolerating diet  No abdominal pain  The following portions of the patient's history were reviewed and updated as appropriate:   He  has a past medical history of Seizures ([facility] [address] ) and Sleep apnea  He   Patient Active Problem List    Diagnosis Date Noted    Umbilical hernia without obstruction and without gangrene 09/29/2021     He  has a past surgical history that includes No past surgeries and pr repair umbilical YGKR,8+L/R,CHASQ (N/A, 1/7/2022)  His family history includes Diabetes in his mother; Hypertension in his mother; No Known Problems in his brother, father, sister, and sister  He  reports that he has been smoking cigarettes  He has been smoking about 1 00 pack per day  He has never used smokeless tobacco  He reports previous alcohol use  He reports that he does not use drugs  Current Outpatient Medications   Medication Sig Dispense Refill    oxyCODONE-acetaminophen (PERCOCET) 5-325 mg per tablet Take 1 tablet by mouth every 4 (four) hours as needed for moderate pain or severe pain Max Daily Amount: 6 tablets 20 tablet 0     No current facility-administered medications for this visit  He has No Known Allergies       Review of Systems   Constitutional: Negative for activity change, appetite change, chills, diaphoresis, fatigue, fever and unexpected weight change  Gastrointestinal: Negative for abdominal pain, diarrhea, nausea and vomiting     Skin: Negative for color change, pallor, rash and wound  Objective:      /80   Pulse 88   Temp 98 5 °F (36 9 °C)   Ht 5' 10" (1 778 m)   Wt 122 kg (269 lb)   BMI 38 60 kg/m²          Physical Exam  Vitals reviewed  Constitutional:       General: He is not in acute distress  Appearance: Normal appearance  He is not ill-appearing, toxic-appearing or diaphoretic  Abdominal:      General: There is no distension  Palpations: There is no mass  Tenderness: There is no abdominal tenderness  Hernia: No hernia is present  Comments: Incision clean dry intact   Neurological:      Mental Status: He is alert

## 2022-02-24 NOTE — ASSESSMENT & PLAN NOTE
Status post umbilical hernia repair  Doing well  Patient is approximately 6 weeks out 1 not require any additional restrictions  Follow-up romaine Cespedes

## 2022-04-29 ENCOUNTER — HOSPITAL ENCOUNTER (EMERGENCY)
Facility: HOSPITAL | Age: 42
Discharge: HOME | End: 2022-04-30
Attending: EMERGENCY MEDICINE
Payer: COMMERCIAL

## 2022-04-29 DIAGNOSIS — M79.601 RIGHT ARM PAIN: Primary | ICD-10-CM

## 2022-04-29 PROCEDURE — 99283 EMERGENCY DEPT VISIT LOW MDM: CPT

## 2022-04-30 VITALS
RESPIRATION RATE: 18 BRPM | OXYGEN SATURATION: 96 % | DIASTOLIC BLOOD PRESSURE: 77 MMHG | TEMPERATURE: 98 F | SYSTOLIC BLOOD PRESSURE: 149 MMHG | HEART RATE: 51 BPM

## 2022-04-30 PROCEDURE — 99284 EMERGENCY DEPT VISIT MOD MDM: CPT | Performed by: EMERGENCY MEDICINE

## 2022-04-30 PROCEDURE — 86618 LYME DISEASE ANTIBODY: CPT | Performed by: EMERGENCY MEDICINE

## 2022-04-30 PROCEDURE — 36415 COLL VENOUS BLD VENIPUNCTURE: CPT | Performed by: EMERGENCY MEDICINE

## 2022-04-30 RX ORDER — DOXYCYCLINE HYCLATE 100 MG/1
100 CAPSULE ORAL 2 TIMES DAILY
Qty: 20 CAPSULE | Refills: 0 | Status: SHIPPED | OUTPATIENT
Start: 2022-04-30 | End: 2022-05-10

## 2022-04-30 RX ORDER — ONDANSETRON 4 MG/1
4 TABLET, ORALLY DISINTEGRATING ORAL EVERY 12 HOURS PRN
Qty: 20 TABLET | Refills: 0 | Status: SHIPPED | OUTPATIENT
Start: 2022-04-30

## 2022-04-30 RX ORDER — ONDANSETRON 4 MG/1
4 TABLET, ORALLY DISINTEGRATING ORAL ONCE
Status: COMPLETED | OUTPATIENT
Start: 2022-04-30 | End: 2022-04-30

## 2022-04-30 RX ORDER — DOXYCYCLINE HYCLATE 100 MG/1
100 CAPSULE ORAL ONCE
Status: COMPLETED | OUTPATIENT
Start: 2022-04-30 | End: 2022-04-30

## 2022-04-30 RX ADMIN — DOXYCYCLINE HYCLATE 100 MG: 100 CAPSULE ORAL at 00:42

## 2022-04-30 RX ADMIN — ONDANSETRON 4 MG: 4 TABLET, ORALLY DISINTEGRATING ORAL at 00:42

## 2022-04-30 NOTE — ED PROVIDER NOTES
History  Chief Complaint   Patient presents with    Arm Pain     patient c/o R hand/wrist pain traveling into his forearm starting several days ago  worse tonight  [de-identified] year old male presents for evaluation of right forearm pain  Patient reports he noticed the pain a few days ago, he will intermittently get the pain during the day however feels most painful at night  He cannot identify triggers for the pain  Patient does state he sleeps on either his left or his right side  Pain starts in his mid forearm region as sharp sensation radiates down his arm into his fingers  He does experience a tingling sensation when the pain is present  He denies weakness  Patient does work as a , he is not aware of bug bites in this area, denies other trauma  Patient has not had this pain before  He is taking over-the-counter pain medication at home with intermittent response  Patient does have a scar distal to this area from an injury as a child  He has otherwise been in his usual state of health, denies medical problems such as diabetes  Prior to Admission Medications   Prescriptions Last Dose Informant Patient Reported?  Taking?   oxyCODONE-acetaminophen (PERCOCET) 5-325 mg per tablet   No No   Sig: Take 1 tablet by mouth every 4 (four) hours as needed for moderate pain or severe pain Max Daily Amount: 6 tablets      Facility-Administered Medications: None       Past Medical History:   Diagnosis Date    Seizures ([facility] [address] )     as a child    Sleep apnea        Past Surgical History:   Procedure Laterality Date    NO PAST SURGERIES      OH REPAIR UMBILICAL UXJY,6+T/G,DUPKP N/A 1/7/2022    Procedure: REPAIR HERNIA UMBILICAL;  Surgeon: Weston Nunez DO;  Location: [facility];  Service: General       Family History   Problem Relation Age of Onset    Diabetes Mother     Hypertension Mother     No Known Problems Father     No Known Problems Sister     No Known Problems Brother     No Known Problems Sister      I have reviewed and agree with the history as documented  E-Cigarette/Vaping    E-Cigarette Use Current Some Day User      E-Cigarette/Vaping Substances     Social History     Tobacco Use    Smoking status: Current Every Day Smoker     Packs/day: 1 50     Types: Cigarettes    Smokeless tobacco: Never Used   Vaping Use    Vaping Use: Some days   Substance Use Topics    Alcohol use: Not Currently    Drug use: No       Review of Systems   Constitutional: Negative for activity change, appetite change, chills and fever  Musculoskeletal: Positive for myalgias  Skin: Negative for color change and wound  Neurological: Negative for weakness  All other systems reviewed and are negative  Physical Exam  Physical Exam  Vitals reviewed  Constitutional:       General: He is not in acute distress  Appearance: Normal appearance  He is normal weight  He is not ill-appearing, toxic-appearing or diaphoretic  HENT:      Head: Normocephalic and atraumatic  Right Ear: External ear normal       Left Ear: External ear normal    Eyes:      General:         Right eye: No discharge  Left eye: No discharge  Cardiovascular:      Rate and Rhythm: Bradycardia present  Pulmonary:      Effort: Pulmonary effort is normal  No respiratory distress  Musculoskeletal:         General: Tenderness present  No deformity or signs of injury  Arms:       Comments: Tenderness to right mid forearm anteriorly, palpable subcutaneous nodule, ring of erythema surrounding tender point without streaking or induration; R/U/MC nerves intact to RUE, radial pulse palpable   Skin:     General: Skin is warm  Coloration: Skin is not jaundiced or pale  Findings: Erythema (ring of erythema around tender point on RUE, normothermic) present  Neurological:      General: No focal deficit present  Mental Status: He is alert  Mental status is at baseline           Vital Signs  ED Triage Vitals   Temperature Pulse Respirations Blood Pressure SpO2   04/30/22 0003 04/30/22 0003 04/30/22 0003 04/30/22 0004 04/30/22 0003   98 °F (36 7 °C) (!) 51 18 149/77 96 %      Temp Source Heart Rate Source Patient Position - Orthostatic VS BP Location FiO2 (%)   04/30/22 0003 04/30/22 0003 04/30/22 0004 04/30/22 0004 --   Tympanic Monitor Sitting Right arm       Pain Score       04/30/22 0003       3           Vitals:    04/30/22 0003 04/30/22 0004   BP:  149/77   Pulse: (!) 51    Patient Position - Orthostatic VS:  Sitting         Visual Acuity      ED Medications  Medications   doxycycline hyclate (VIBRAMYCIN) capsule 100 mg (100 mg Oral Given 4/30/22 0042)   ondansetron (ZOFRAN-ODT) dispersible tablet 4 mg (4 mg Oral Given 4/30/22 0042)       Diagnostic Studies  Results Reviewed     Procedure Component Value Units Date/Time    Lyme Antibody Profile with reflex to [facility] [[id number]] Collected: 04/30/22 0041    Lab Status: In process Specimen: Blood from Arm, Left Updated: 04/30/22 0044                 No orders to display              Procedures  Procedures         ED Course  ED Course as of 04/30/22 0645   Sat Apr 30, 2022 0042 POCUS shows small lesion that is non-compressible in area of pain, veins in area are compressible and no veins leading to nodule, no surrounding cobblestoning                                             MDM  Number of Diagnoses or Management Options  Right arm pain  Diagnosis management comments: 26-year-old male presents for evaluation of right forearm pain  On evaluation patient does have a subcutaneous nodule, there also appears to be an erythematous ring surrounding this tender point  Point of care ultrasound does show nodule, no surrounding cobblestoning, nodules was noncompressible  Given ring of erythema and occupation, will test for Lyme, will prophylactically start doxycycline for Lyme disease verses possible infection    Also possible neuroma given trauma in this area, possible neuro symptoms with pain onset  Patient is advised to follow-up with his primary care provider, he may order further imaging if pain persists  Patient was advised of precautions with doxycycline particularly with his occupation  Disposition  Final diagnoses:   Right arm pain     Time reflects when diagnosis was documented in both MDM as applicable and the Disposition within this note     Time User Action Codes Description Comment    4/30/2022 12:33 AM Shauna Said Add [M79 601] Right arm pain       ED Disposition     ED Disposition Condition Date/Time Comment    Discharge Stable Sat Apr 30, 2022 12:33 AM [place] ([place] [facility] discharge to home/self care              Follow-up Information     Follow up With Specialties Details Why Contact Info Additional Information    Sanjeev Ramirez,  Family Medicine Schedule an appointment as soon as possible for a visit   [address] ([address] Emergency Department Emergency Medicine  If symptoms worsen [place] [phone]  [facility] Emergency Department[address], [address]          Discharge Medication List as of 4/30/2022 12:36 AM      START taking these medications    Details   doxycycline hyclate (VIBRAMYCIN) 100 mg capsule Take 1 capsule (100 mg total) by mouth 2 (two) times a day for 10 days, Starting Sat 4/30/2022, Until Tue 5/10/2022, Normal      ondansetron (Zofran ODT) 4 mg disintegrating tablet Take 1 tablet (4 mg total) by mouth every 12 (twelve) hours as needed for nausea or vomiting (Use as needed for nausea related to antibiotic use), Starting Sat 4/30/2022, Normal         CONTINUE these medications which have NOT CHANGED    Details   oxyCODONE-acetaminophen (PERCOCET) 5-325 mg per tablet Take 1 tablet by mouth every 4 (four) hours as needed for moderate pain or severe pain Max Daily Amount: 6 tablets, Starting Fri 1/7/2022, Normal             No discharge procedures on file      PDMP Review     None          ED Provider  Electronically Signed by           Eugene Zavala DO  04/30/22 9460

## 2022-05-02 ENCOUNTER — OFFICE VISIT (OUTPATIENT)
Dept: FAMILY MEDICINE CLINIC | Facility: CLINIC | Age: 42
End: 2022-05-02
Payer: COMMERCIAL

## 2022-05-02 VITALS
SYSTOLIC BLOOD PRESSURE: 132 MMHG | DIASTOLIC BLOOD PRESSURE: 68 MMHG | OXYGEN SATURATION: 97 % | BODY MASS INDEX: 38.08 KG/M2 | WEIGHT: 266 LBS | HEIGHT: 70 IN | HEART RATE: 68 BPM | TEMPERATURE: 98.4 F

## 2022-05-02 DIAGNOSIS — R20.2 RIGHT HAND PARESTHESIA: Primary | ICD-10-CM

## 2022-05-02 PROCEDURE — 99213 OFFICE O/P EST LOW 20 MIN: CPT | Performed by: PHYSICIAN ASSISTANT

## 2022-05-02 NOTE — PROGRESS NOTES
Assessment/Plan:    Problem List Items Addressed This Visit     None      Visit Diagnoses     Right hand paresthesia    -  Primary    Relevant Orders    Ambulatory Referral to Hand Surgery           Diagnoses and all orders for this visit:    Right hand paresthesia  -     Ambulatory Referral to Hand Surgery; Future              Subjective:      Patient ID: Clora Ormond is a 39 y o  male  Pt here today for ER follow up after developing pain/tingling in R hand/forearm, states was fine and then "woke up like this "       The following portions of the patient's history were reviewed and updated as appropriate:   He has a past medical history of Seizures ([address] ) and Sleep apnea  ,  does not have any pertinent problems on file  ,   has a past surgical history that includes No past surgeries and pr repair umbilical MXSQ,0+M/O,XEPKA (N/A, 1/7/2022)  ,  family history includes Diabetes in his mother; Hypertension in his mother; No Known Problems in his brother, father, sister, and sister  ,   reports that he has been smoking cigarettes  He has been smoking about 1 50 packs per day  He has never used smokeless tobacco  He reports previous alcohol use  He reports that he does not use drugs  ,  has No Known Allergies     Current Outpatient Medications   Medication Sig Dispense Refill    doxycycline hyclate (VIBRAMYCIN) 100 mg capsule Take 1 capsule (100 mg total) by mouth 2 (two) times a day for 10 days 20 capsule 0    ondansetron (Zofran ODT) 4 mg disintegrating tablet Take 1 tablet (4 mg total) by mouth every 12 (twelve) hours as needed for nausea or vomiting (Use as needed for nausea related to antibiotic use) 20 tablet 0    oxyCODONE-acetaminophen (PERCOCET) 5-325 mg per tablet Take 1 tablet by mouth every 4 (four) hours as needed for moderate pain or severe pain Max Daily Amount: 6 tablets 20 tablet 0     No current facility-administered medications for this visit         Review of Systems   Constitutional: Negative for activity change, appetite change, chills, diaphoresis, fatigue, fever and unexpected weight change  HENT: Negative for congestion, ear pain, postnasal drip, rhinorrhea, sinus pressure, sinus pain, sneezing, sore throat, tinnitus and voice change  Eyes: Negative for pain, redness and visual disturbance  Respiratory: Negative for cough, chest tightness, shortness of breath and wheezing  Cardiovascular: Negative for chest pain, palpitations and leg swelling  Gastrointestinal: Negative for abdominal pain, blood in stool, constipation, diarrhea, nausea and vomiting  Genitourinary: Negative for difficulty urinating, dysuria, frequency, hematuria and urgency  Musculoskeletal: Negative for arthralgias, back pain, gait problem, joint swelling, myalgias, neck pain and neck stiffness  Skin: Negative for color change, pallor, rash and wound  Neurological: Negative for dizziness, tremors, weakness, light-headedness and headaches  Pt with tingling in entire R hand, also with pea sized tender lump in R forearm   Psychiatric/Behavioral: Negative for dysphoric mood, self-injury, sleep disturbance and suicidal ideas  The patient is not nervous/anxious  Objective:  Vitals:    05/02/22 1453   BP: 132/68   Pulse: 68   Temp: 98 4 °F (36 9 °C)   SpO2: 97%   Weight: 121 kg (266 lb)   Height: 5' 10" (1 778 m)     Body mass index is 38 17 kg/m²  Physical Exam  Vitals reviewed  Constitutional:       General: He is not in acute distress  Appearance: He is well-developed  He is not diaphoretic  HENT:      Head: Normocephalic and atraumatic  Right Ear: Hearing, tympanic membrane, ear canal and external ear normal       Left Ear: Hearing, tympanic membrane, ear canal and external ear normal       Mouth/Throat:      Pharynx: Uvula midline  No oropharyngeal exudate  Eyes:      General: No scleral icterus  Right eye: No discharge  Left eye: No discharge        Conjunctiva/sclera: Conjunctivae normal    Neck:      Thyroid: No thyromegaly  Vascular: No carotid bruit  Cardiovascular:      Rate and Rhythm: Normal rate and regular rhythm  Heart sounds: Normal heart sounds  No murmur heard  Pulmonary:      Effort: Pulmonary effort is normal  No respiratory distress  Breath sounds: Normal breath sounds  No wheezing  Abdominal:      General: Bowel sounds are normal  There is no distension  Palpations: Abdomen is soft  There is no mass  Tenderness: There is no abdominal tenderness  There is no guarding or rebound  Musculoskeletal:         General: No tenderness  Normal range of motion  Arms:       Cervical back: Neck supple  Lymphadenopathy:      Cervical: No cervical adenopathy  Skin:     General: Skin is warm and dry  Findings: No erythema or rash  Neurological:      Mental Status: He is alert and oriented to person, place, and time  Psychiatric:         Behavior: Behavior normal          Thought Content:  Thought content normal          Judgment: Judgment normal

## 2022-05-03 LAB — B BURGDOR IGG+IGM SER-ACNC: 0

## 2022-05-04 ENCOUNTER — TELEPHONE (OUTPATIENT)
Dept: FAMILY MEDICINE CLINIC | Facility: CLINIC | Age: 42
End: 2022-05-04

## 2022-05-04 ENCOUNTER — TELEPHONE (OUTPATIENT)
Dept: OBGYN CLINIC | Facility: HOSPITAL | Age: 42
End: 2022-05-04

## 2022-05-04 NOTE — TELEPHONE ENCOUNTER
Gerard Osorio is calling from [facility] to ask our office to please call the patient's wife, Linda Carbajal [phone] to schedule from the referral from their office

## 2022-05-04 NOTE — TELEPHONE ENCOUNTER
1  Pt no longer needs to take the medication  2  [facility] please look into referral               ----- Message from Bing Felpie sent at 5/4/2022  9:12 AM EDT -----  Regarding: FW: Medicine    ----- Message -----  From: Elizabeth Mi  Sent: 5/4/2022   8:55 AM EDT  To: [facility]  Subject: Medicine                                         I received my test results back and the were neg for Lyme disease  I went to take my medicine last night and could not find my antibiotics or the zolfrin anywhere I think I might of accidentally thrown it away  If I need to keep taking it is there a way to get it replaced I have looked everywhere and can't find it  Also I haven't gotten a call for the hand doctor or I may have missed it  If I did can you have them contact my wife at [phone] and she will set up the appt  For me   Thank you

## 2022-11-15 ENCOUNTER — OFFICE VISIT (OUTPATIENT)
Dept: URGENT CARE | Facility: MEDICAL CENTER | Age: 42
End: 2022-11-15

## 2022-11-15 VITALS
WEIGHT: 248 LBS | BODY MASS INDEX: 35.5 KG/M2 | HEIGHT: 70 IN | RESPIRATION RATE: 20 BRPM | HEART RATE: 71 BPM | DIASTOLIC BLOOD PRESSURE: 83 MMHG | OXYGEN SATURATION: 97 % | TEMPERATURE: 98.7 F | SYSTOLIC BLOOD PRESSURE: 142 MMHG

## 2022-11-15 DIAGNOSIS — K08.89 PAIN, DENTAL: Primary | ICD-10-CM

## 2022-11-15 RX ORDER — AMOXICILLIN 500 MG/1
500 CAPSULE ORAL EVERY 8 HOURS SCHEDULED
Qty: 21 CAPSULE | Refills: 0 | Status: SHIPPED | OUTPATIENT
Start: 2022-11-15 | End: 2022-11-22

## 2022-11-15 RX ORDER — IBUPROFEN 600 MG/1
600 TABLET ORAL EVERY 6 HOURS PRN
Qty: 30 TABLET | Refills: 0 | Status: SHIPPED | OUTPATIENT
Start: 2022-11-15

## 2022-11-15 NOTE — PROGRESS NOTES
[facility]        NAME: Tony Lal is a 43 y o  male  : 1980    MRN: [id number]  DATE: November 15, 2022  TIME: 6:56 PM    Assessment and Plan   Pain, dental [[address] 89]  1  Pain, dental  Ambulatory Referral to Dentistry    amoxicillin (AMOXIL) 500 mg capsule    ibuprofen (MOTRIN) 600 mg tablet         Patient Instructions       Follow up with PCP in 3-5 days  Proceed to  ER if symptoms worsen  Chief Complaint     Chief Complaint   Patient presents with   • Dental Pain     Toothache x 3-4 days, taking tyelnol         History of Present Illness       Patient presents with a 3 day history of right lower dental pain  He has a chipped tooth does not have an upcoming dental appointment  He was referred to Dr Paige Henley here in [place]  Review of Systems   Review of Systems   Constitutional: Negative for chills and fever  HENT: Positive for dental problem  Negative for trouble swallowing  Hematological: Negative for adenopathy           Current Medications       Current Outpatient Medications:   •  amoxicillin (AMOXIL) 500 mg capsule, Take 1 capsule (500 mg total) by mouth every 8 (eight) hours for 7 days, Disp: 21 capsule, Rfl: 0  •  ibuprofen (MOTRIN) 600 mg tablet, Take 1 tablet (600 mg total) by mouth every 6 (six) hours as needed for mild pain Take with food, Disp: 30 tablet, Rfl: 0  •  ondansetron (Zofran ODT) 4 mg disintegrating tablet, Take 1 tablet (4 mg total) by mouth every 12 (twelve) hours as needed for nausea or vomiting (Use as needed for nausea related to antibiotic use) (Patient not taking: Reported on 11/15/2022), Disp: 20 tablet, Rfl: 0  •  oxyCODONE-acetaminophen (PERCOCET) 5-325 mg per tablet, Take 1 tablet by mouth every 4 (four) hours as needed for moderate pain or severe pain Max Daily Amount: 6 tablets (Patient not taking: Reported on 11/15/2022), Disp: 20 tablet, Rfl: 0    Current Allergies     Allergies as of 11/15/2022   • (No Known Allergies) The following portions of the patient's history were reviewed and updated as appropriate: allergies, current medications, past family history, past medical history, past social history, past surgical history and problem list      Past Medical History:   Diagnosis Date   • Seizures ([address] )     as a child   • Sleep apnea        Past Surgical History:   Procedure Laterality Date   • NO PAST SURGERIES     • NM REPAIR UMBILICAL PDAF,4+B/N,XMOZF N/A 1/7/2022    Procedure: REPAIR HERNIA UMBILICAL;  Surgeon: Fran Atkinson DO;  Location: [facility];  Service: General       Family History   Problem Relation Age of Onset   • Diabetes Mother    • Hypertension Mother    • No Known Problems Father    • No Known Problems Sister    • No Known Problems Brother    • No Known Problems Sister          Medications have been verified  Objective   /83   Pulse 71   Temp 98 7 °F (37 1 °C)   Resp 20   Ht 5' 10" (1 778 m)   Wt 112 kg (248 lb)   SpO2 97%   BMI 35 58 kg/m²   No LMP for male patient  Physical Exam     Physical Exam  Vitals and nursing note reviewed  Constitutional:       Appearance: Normal appearance  HENT:      Head: Normocephalic and atraumatic  Mouth/Throat:      Mouth: Mucous membranes are moist       Pharynx: Oropharynx is clear  Comments: Chipped lower left premolar  No pain on percussion  Cardiovascular:      Rate and Rhythm: Normal rate and regular rhythm  Pulmonary:      Effort: Pulmonary effort is normal    Neurological:      Mental Status: He is alert

## 2023-02-22 ENCOUNTER — HOSPITAL ENCOUNTER (EMERGENCY)
Facility: HOSPITAL | Age: 43
Discharge: HOME | End: 2023-02-22
Attending: EMERGENCY MEDICINE

## 2023-02-22 VITALS
WEIGHT: 255.51 LBS | DIASTOLIC BLOOD PRESSURE: 81 MMHG | TEMPERATURE: 96.7 F | OXYGEN SATURATION: 97 % | SYSTOLIC BLOOD PRESSURE: 175 MMHG | RESPIRATION RATE: 16 BRPM | HEART RATE: 59 BPM | BODY MASS INDEX: 36.66 KG/M2

## 2023-02-22 DIAGNOSIS — R03.0 ELEVATED BLOOD PRESSURE READING: ICD-10-CM

## 2023-02-22 DIAGNOSIS — K04.7 DENTAL INFECTION: Primary | ICD-10-CM

## 2023-02-22 RX ORDER — CHLORHEXIDINE GLUCONATE 0.12 MG/ML
15 RINSE ORAL 2 TIMES DAILY
Qty: 120 ML | Refills: 0 | Status: SHIPPED | OUTPATIENT
Start: 2023-02-22

## 2023-02-22 RX ORDER — KETOROLAC TROMETHAMINE 30 MG/ML
15 INJECTION, SOLUTION INTRAMUSCULAR; INTRAVENOUS ONCE
Status: COMPLETED | OUTPATIENT
Start: 2023-02-22 | End: 2023-02-22

## 2023-02-22 RX ORDER — AMOXICILLIN 250 MG/1
1000 CAPSULE ORAL ONCE
Status: COMPLETED | OUTPATIENT
Start: 2023-02-22 | End: 2023-02-22

## 2023-02-22 RX ORDER — AMOXICILLIN 500 MG/1
500 CAPSULE ORAL EVERY 8 HOURS SCHEDULED
Qty: 21 CAPSULE | Refills: 0 | Status: SHIPPED | OUTPATIENT
Start: 2023-02-22 | End: 2023-03-01

## 2023-02-22 RX ORDER — LIDOCAINE HYDROCHLORIDE 20 MG/ML
15 SOLUTION OROPHARYNGEAL ONCE
Status: COMPLETED | OUTPATIENT
Start: 2023-02-22 | End: 2023-02-22

## 2023-02-22 RX ORDER — NAPROXEN 500 MG/1
500 TABLET ORAL
Qty: 30 TABLET | Refills: 0 | Status: SHIPPED | OUTPATIENT
Start: 2023-02-22

## 2023-02-22 RX ADMIN — AMOXICILLIN 1000 MG: 250 CAPSULE ORAL at 02:14

## 2023-02-22 RX ADMIN — KETOROLAC TROMETHAMINE 15 MG: 30 INJECTION, SOLUTION INTRAMUSCULAR; INTRAVENOUS at 02:12

## 2023-02-22 RX ADMIN — LIDOCAINE HYDROCHLORIDE 15 ML: 20 SOLUTION ORAL at 02:15

## 2023-02-22 NOTE — ED PROVIDER NOTES
History  Chief Complaint   Patient presents with   • Dental Pain     Dental pain started earlier today, took Tylenol 3 tablets about 1700  Darren Rust is a 43y o  year old male without PCP presenting to the [place] [address] ED for dental pain  Patient has had one day of right upper pain  Patient known to have cracked tooth in this area chronically  Pain currently 5/10, constant and radiating superiorly toward ear  No reported bleeding/drainage  No facial swelling  No pain/swelling in the neck  Patient denies associated fevers, sore throat, trouble swallowing or change in voice  He has had similar symptoms in the past which were treated with antibiotics  The patient has taken tylenol at home for symptomatic treatment  Patient was in normal state of health prior to symptom onset and denies any other complaints at time of evaluation  History provided by:  Patient   used: No    Dental Pain  Associated symptoms: no facial swelling, no fever and no neck pain        Prior to Admission Medications   Prescriptions Last Dose Informant Patient Reported?  Taking?   ibuprofen (MOTRIN) 600 mg tablet   No No   Sig: Take 1 tablet (600 mg total) by mouth every 6 (six) hours as needed for mild pain Take with food   ondansetron (Zofran ODT) 4 mg disintegrating tablet   No No   Sig: Take 1 tablet (4 mg total) by mouth every 12 (twelve) hours as needed for nausea or vomiting (Use as needed for nausea related to antibiotic use)   Patient not taking: Reported on 11/15/2022   oxyCODONE-acetaminophen (PERCOCET) 5-325 mg per tablet   No No   Sig: Take 1 tablet by mouth every 4 (four) hours as needed for moderate pain or severe pain Max Daily Amount: 6 tablets   Patient not taking: Reported on 11/15/2022      Facility-Administered Medications: None       Past Medical History:   Diagnosis Date   • Seizures ([facility] [address] )     as a child   • Sleep apnea        Past Surgical History:   Procedure Laterality Date   • NO PAST SURGERIES     • [facility] RPR UMBILICAL HRNA 5 YRS/> REDUCIBLE N/A 1/7/2022    Procedure: REPAIR HERNIA UMBILICAL;  Surgeon: Shilo Carrero DO;  Location: [facility];  Service: General       Family History   Problem Relation Age of Onset   • Diabetes Mother    • Hypertension Mother    • No Known Problems Father    • No Known Problems Sister    • No Known Problems Brother    • No Known Problems Sister      I have reviewed and agree with the history as documented  E-Cigarette/Vaping   • E-Cigarette Use Never User      E-Cigarette/Vaping Substances     Social History     Tobacco Use   • Smoking status: Every Day     Packs/day: 1 50     Types: Cigarettes   • Smokeless tobacco: Never   Vaping Use   • Vaping Use: Never used   Substance Use Topics   • Alcohol use: Not Currently   • Drug use: No       Review of Systems   Constitutional: Negative for chills and fever  HENT: Positive for dental problem  Negative for facial swelling, trouble swallowing and voice change  Respiratory: Negative for shortness of breath  Cardiovascular: Negative for chest pain  Gastrointestinal: Negative for abdominal pain, nausea and vomiting  Musculoskeletal: Negative for neck pain  All other systems reviewed and are negative  Physical Exam  Physical Exam  Vitals and nursing note reviewed  Constitutional:       General: He is not in acute distress  Appearance: Normal appearance  He is well-developed  He is not ill-appearing, toxic-appearing or diaphoretic  HENT:      Head: Normocephalic and atraumatic  Jaw: Tenderness present  No trismus, swelling, pain on movement or malocclusion  Mouth/Throat:      Mouth: No angioedema  Dentition: Abnormal dentition  Dental tenderness and dental caries (tooth #1 cracked, TTP  No bleeding or drainage ) present  Tongue: Tongue does not deviate from midline  Pharynx: Uvula midline   No pharyngeal swelling, oropharyngeal exudate, posterior oropharyngeal erythema or uvula swelling  Tonsils: No tonsillar exudate or tonsillar abscesses  Neck:      Comments: No submandibular edema/tenderness  Cardiovascular:      Rate and Rhythm: Normal rate and regular rhythm  Pulmonary:      Effort: Pulmonary effort is normal  No respiratory distress  Breath sounds: Normal breath sounds  No wheezing or rales  Abdominal:      Palpations: Abdomen is soft  Tenderness: There is no abdominal tenderness  There is no guarding or rebound  Musculoskeletal:      Cervical back: No edema, erythema, rigidity or crepitus  No spinous process tenderness or muscular tenderness  Neurological:      Mental Status: He is alert  Psychiatric:         Mood and Affect: Mood normal          Behavior: Behavior normal          Vital Signs  ED Triage Vitals [02/22/23 0149]   Temperature Pulse Respirations Blood Pressure SpO2   (!) 96 7 °F (35 9 °C) 59 16 (!) 175/81 97 %      Temp Source Heart Rate Source Patient Position - Orthostatic VS BP Location FiO2 (%)   Tympanic Monitor Sitting Left arm --      Pain Score       5           Vitals:    02/22/23 0149   BP: (!) 175/81   Pulse: 59   Patient Position - Orthostatic VS: Sitting         Visual Acuity      ED Medications  Medications   amoxicillin (AMOXIL) capsule 1,000 mg (1,000 mg Oral Given 2/22/23 0214)   ketorolac (TORADOL) injection 15 mg (15 mg Intramuscular Given 2/22/23 0212)   Lidocaine Viscous HCl (XYLOCAINE) 2 % mucosal solution 15 mL (15 mL Swish & Spit Given 2/22/23 0215)       Diagnostic Studies  Results Reviewed     None                 No orders to display              Procedures  Procedures         ED Course                                             Medical Decision Making    43 y o  male presenting for dental pain  Dental malu on exam   No s/s airway or neck edema/compromise  Will treat with antibiotics and symptomatic relief  Patient offered and declines nerve block      I have discussed with the patient our plan to discharge them from the ED and the patient is in agreement with this plan  The patient was provided a written after visit summary with strict RTED precautions  Discharge Plan: Rx for amoxicillin, naproxen, peridex  Continue tylenol PRN  Followup: I have discussed with the patient plan to follow up with a dental clinic and PCP  Contact information provided in AVS     Dental infection: acute illness or injury  Elevated blood pressure reading: acute illness or injury  Risk  Prescription drug management  Disposition  Final diagnoses:   Dental infection   Elevated blood pressure reading     Time reflects when diagnosis was documented in both MDM as applicable and the Disposition within this note     Time User Action Codes Description Comment    2/22/2023  2:01 AM Geetha [place] Add [K04 7] Dental infection     2/22/2023  2:03 AM Geetha [place] Add [R03 0] Elevated blood pressure reading       ED Disposition     ED Disposition   Discharge    Condition   Stable    Date/Time   Wed Feb 22, 2023  2:00 AM    [address] [place] discharge to home/self care  Follow-up Information     Follow up With Specialties Details Why Contact Info Additional [address] [facility] Dentistry Schedule an appointment as soon as possible for a visit  To make appointment for reevaluation ASA  [address] [facility] [address]  [address] [facility], [address] [facility] [place], [place], [address], [facility], DO Family Medicine Schedule an appointment as soon as possible for a visit  To establish care   [address] [facility]  [address] [phone]             Discharge Medication List as of 2/22/2023  2:03 AM      START taking these medications    Details   amoxicillin (AMOXIL) 500 mg capsule Take 1 capsule (500 mg total) by mouth every 8 (eight) hours for 7 days, Starting Wed 2/22/2023, Until Wed 3/1/2023, Normal chlorhexidine (PERIDEX) 0 12 % solution Apply 15 mL to the mouth or throat 2 (two) times a day, Starting Wed 2/22/2023, Normal      naproxen (Naprosyn) 500 mg tablet Take 1 tablet (500 mg total) by mouth 3 (three) times a day with meals, Starting Wed 2/22/2023, Normal         CONTINUE these medications which have NOT CHANGED    Details   ibuprofen (MOTRIN) 600 mg tablet Take 1 tablet (600 mg total) by mouth every 6 (six) hours as needed for mild pain Take with food, Starting Tue 11/15/2022, Normal      ondansetron (Zofran ODT) 4 mg disintegrating tablet Take 1 tablet (4 mg total) by mouth every 12 (twelve) hours as needed for nausea or vomiting (Use as needed for nausea related to antibiotic use), Starting Sat 4/30/2022, Normal      oxyCODONE-acetaminophen (PERCOCET) 5-325 mg per tablet Take 1 tablet by mouth every 4 (four) hours as needed for moderate pain or severe pain Max Daily Amount: 6 tablets, Starting Fri 1/7/2022, Normal             No discharge procedures on file      PDMP Review     None          ED Provider  Electronically Signed by           Andre Ayala DO  02/22/23 5868

## 2023-02-22 NOTE — DISCHARGE INSTRUCTIONS
You have been seen for dental pain  Please complete the course of antibiotics as prescribed  You can take the prescribed naproxen for pain  Return to the emergency department if you develop worsening pain, throat/facial swelling, fevers, neck pain/swelling or any other symptoms of concern  Please follow up with a dental clinic by calling the number provided  Your blood pressure is elevated on your visit today  When left untreated, the damage that high blood pressure does to your circulatory system is a significant contributing factor to heart attack, stroke, chronic kidney disease and other health threats  Please arrange for a blood pressure recheck with a PCP within the next week for further evaluation

## 2023-02-25 ENCOUNTER — HOSPITAL ENCOUNTER (EMERGENCY)
Facility: HOSPITAL | Age: 43
Discharge: HOME | End: 2023-02-25
Attending: STUDENT IN AN ORGANIZED HEALTH CARE EDUCATION/TRAINING PROGRAM | Admitting: STUDENT IN AN ORGANIZED HEALTH CARE EDUCATION/TRAINING PROGRAM

## 2023-02-25 VITALS
SYSTOLIC BLOOD PRESSURE: 180 MMHG | RESPIRATION RATE: 20 BRPM | OXYGEN SATURATION: 99 % | HEART RATE: 88 BPM | TEMPERATURE: 97.9 F | DIASTOLIC BLOOD PRESSURE: 90 MMHG

## 2023-02-25 DIAGNOSIS — R03.0 ELEVATED BLOOD PRESSURE READING: ICD-10-CM

## 2023-02-25 DIAGNOSIS — K08.89 PAIN, DENTAL: Primary | ICD-10-CM

## 2023-02-25 RX ORDER — ACETAMINOPHEN 325 MG/1
975 TABLET ORAL ONCE
Status: DISCONTINUED | OUTPATIENT
Start: 2023-02-25 | End: 2023-02-26 | Stop reason: HOSPADM

## 2023-02-26 NOTE — ED PROVIDER NOTES
History  Chief Complaint   Patient presents with   • Dental Pain     Patient here for dental pain     Patient is a 27-year-old male, no pertinent past medical history, who presents to the emergency department for persistent dental pain  Patient states he was seen here 3 days ago for this pain  He was given antibiotics and pain medications which he has been taking  He states overall the pain has improved however he is concerned that it persists  No modifying factors  No other associate symptoms  Denies any difficulty swallowing, fevers, chills, or shortness of breath  States he has been unable to see a dentist   No other complaints or concerns at this time  Chart reviewed  Patient seen in the emergency permit on 0/57/8166  Treated with Toradol and amoxicillin  Discharged with prescription for amoxicillin, Naprosyn, and Peridex mouthwash          Prior to Admission Medications   Prescriptions Last Dose Informant Patient Reported?  Taking?   amoxicillin (AMOXIL) 500 mg capsule 2/25/2023  No Yes   Sig: Take 1 capsule (500 mg total) by mouth every 8 (eight) hours for 7 days   chlorhexidine (PERIDEX) 0 12 % solution 2/25/2023  No Yes   Sig: Apply 15 mL to the mouth or throat 2 (two) times a day   ibuprofen (MOTRIN) 600 mg tablet 2/25/2023  No Yes   Sig: Take 1 tablet (600 mg total) by mouth every 6 (six) hours as needed for mild pain Take with food   naproxen (Naprosyn) 500 mg tablet 2/25/2023  No Yes   Sig: Take 1 tablet (500 mg total) by mouth 3 (three) times a day with meals   ondansetron (Zofran ODT) 4 mg disintegrating tablet   No No   Sig: Take 1 tablet (4 mg total) by mouth every 12 (twelve) hours as needed for nausea or vomiting (Use as needed for nausea related to antibiotic use)   Patient not taking: Reported on 11/15/2022   oxyCODONE-acetaminophen (PERCOCET) 5-325 mg per tablet   No No   Sig: Take 1 tablet by mouth every 4 (four) hours as needed for moderate pain or severe pain Max Daily Amount: 6 tablets   Patient not taking: Reported on 11/15/2022      Facility-Administered Medications: None       Past Medical History:   Diagnosis Date   • Seizures ([address] )     as a child   • Sleep apnea        Past Surgical History:   Procedure Laterality Date   • NO PAST SURGERIES     • [facility] RPR UMBILICAL HRNA 5 YRS/> REDUCIBLE N/A 1/7/2022    Procedure: REPAIR HERNIA UMBILICAL;  Surgeon: Sheeba Brantley DO;  Location: [facility];  Service: General       Family History   Problem Relation Age of Onset   • Diabetes Mother    • Hypertension Mother    • No Known Problems Father    • No Known Problems Sister    • No Known Problems Brother    • No Known Problems Sister      I have reviewed and agree with the history as documented  E-Cigarette/Vaping   • E-Cigarette Use Never User      E-Cigarette/Vaping Substances     Social History     Tobacco Use   • Smoking status: Every Day     Packs/day: 1 50     Types: Cigarettes   • Smokeless tobacco: Never   Vaping Use   • Vaping Use: Never used   Substance Use Topics   • Alcohol use: Not Currently   • Drug use: No       Review of Systems   Constitutional: Negative for chills and fever  HENT: Positive for dental problem  Negative for sore throat and trouble swallowing  All other systems reviewed and are negative  Physical Exam  Physical Exam  Vitals and nursing note reviewed  Constitutional:       General: He is not in acute distress  Appearance: He is well-developed  He is not diaphoretic  HENT:      Head: Normocephalic and atraumatic  Right Ear: External ear normal       Left Ear: External ear normal       Nose: Nose normal       Mouth/Throat:        Comments: No stridor, no pooled secretions  No submandibular edema or tenderness  Eyes:      General: Lids are normal  No scleral icterus  Cardiovascular:      Rate and Rhythm: Normal rate and regular rhythm  Heart sounds: No murmur heard  No gallop     Pulmonary:      Effort: Pulmonary effort is normal  No respiratory distress  Musculoskeletal:      Cervical back: Normal range of motion and neck supple  Skin:     General: Skin is warm and dry  Neurological:      General: No focal deficit present  Mental Status: He is alert  Psychiatric:         Mood and Affect: Mood normal          Behavior: Behavior normal          Vital Signs  ED Triage Vitals [02/25/23 2337]   Temperature Pulse Respirations Blood Pressure SpO2   97 9 °F (36 6 °C) 88 20 (!) 180/90 99 %      Temp Source Heart Rate Source Patient Position - Orthostatic VS BP Location FiO2 (%)   Tympanic Monitor Sitting Right arm --      Pain Score       3           Vitals:    02/25/23 2337   BP: (!) 180/90   Pulse: 88   Patient Position - Orthostatic VS: Sitting         Visual Acuity      ED Medications  Medications - No data to display    Diagnostic Studies  Results Reviewed     None                 No orders to display              Procedures  Procedures         ED Course                                             Medical Decision Making  Patient is a 43 y o  male who presents to the ED for dental pain  Patient is nontoxic, well-appearing  She has hypertensive but otherwise vitals are stable  Discussed with patient there is no further intervention indicated in the emergency department at this time  Will discharge  Offered further pain medications but patient declined  Explained he will need to see a dentist for likely extraction of the tooth  Discussed [facility] dental clinic follow-up  Return precautions discussed  Patient verbalized understanding and agreed to plan of care  Portions of the record may have been created with voice recognition software  Occasional wrong word or "sound a like" substitutions may have occurred due to the inherent limitations of voice recognition software  Read the chart carefully and recognize, using context, where substitutions have occurred      Elevated blood pressure reading: acute illness or injury  Pain, dental: acute illness or injury      Disposition  Final diagnoses:   Pain, dental   Elevated blood pressure reading     Time reflects when diagnosis was documented in both MDM as applicable and the Disposition within this note     Time User Action Codes Description Comment    2/25/2023 11:41 PM Chanel Rodriguez Add [K08 89] Pain, dental     2/25/2023 11:41 PM Chanel Rodriguez Add [R03 0] Elevated blood pressure reading       ED Disposition     ED Disposition   Discharge    Condition   Stable    Date/Time   Sat Feb 25, 2023 11:41 PM    [address] discharge to home/self care                 Follow-up Information     Follow up With Specialties Details Why Contact Info Additional [address] [facility] Family Medicine   [address] [address]) [place] [address] [address] [facility] Emergency Department Emergency Medicine  As needed [address] [facility], [address], [facility] [address]   Schedule an appointment as soon as possible for a visit   [address] [facility] [address]  [address]  [phone]           Discharge Medication List as of 2/25/2023 11:43 PM      CONTINUE these medications which have NOT CHANGED    Details   amoxicillin (AMOXIL) 500 mg capsule Take 1 capsule (500 mg total) by mouth every 8 (eight) hours for 7 days, Starting Wed 2/22/2023, Until Wed 3/1/2023, Normal      chlorhexidine (PERIDEX) 0 12 % solution Apply 15 mL to the mouth or throat 2 (two) times a day, Starting Wed 2/22/2023, Normal      ibuprofen (MOTRIN) 600 mg tablet Take 1 tablet (600 mg total) by mouth every 6 (six) hours as needed for mild pain Take with food, Starting Tue 11/15/2022, Normal      naproxen (Naprosyn) 500 mg tablet Take 1 tablet (500 mg total) by mouth 3 (three) times a day with meals, Starting Wed 2/22/2023, Normal      ondansetron (Zofran ODT) 4 mg disintegrating tablet Take 1 tablet (4 mg total) by mouth every 12 (twelve) hours as needed for nausea or vomiting (Use as needed for nausea related to antibiotic use), Starting Sat 4/30/2022, Normal      oxyCODONE-acetaminophen (PERCOCET) 5-325 mg per tablet Take 1 tablet by mouth every 4 (four) hours as needed for moderate pain or severe pain Max Daily Amount: 6 tablets, Starting Fri 1/7/2022, Normal             No discharge procedures on file      PDMP Review     None          ED Provider  Electronically Signed by           Dylon Dailey DO  02/26/23 6102

## 2023-02-26 NOTE — DISCHARGE INSTRUCTIONS
You were seen in the Emergency Department for dental pain    Please use the following pain medications as prescribed:  - Tylenol 650mg every 6 hours  - Motrin 400mg every 6 hours  - Orajel OTC  They work in different ways so can be used together at the same time  Please continue to use the antibiotics that were prescribed to you  If you HAVE A DENTIST, please call to make an appointment with them for follow-up as soon as possible  You may also follow-up with the [facility] if you don't have a density  [address] [facility] [address]  [address] [facility]  [address] [place] [address]  [phone]  They will see you with or without insurance and have walk in hours in the morning without appointments  It is also recommended you follow-up with your family doctor for your elevated blood pressure  If you do not have a family doctor you may call "[facility]olink" to schedule an appointment with one

## 2023-03-14 ENCOUNTER — OFFICE VISIT (OUTPATIENT)
Dept: DENTISTRY | Facility: CLINIC | Age: 43
End: 2023-03-14

## 2023-03-14 VITALS — DIASTOLIC BLOOD PRESSURE: 82 MMHG | HEART RATE: 59 BPM | SYSTOLIC BLOOD PRESSURE: 157 MMHG

## 2023-03-14 DIAGNOSIS — K08.89 PAIN, DENTAL: Primary | ICD-10-CM

## 2023-03-14 RX ORDER — AMOXICILLIN AND CLAVULANATE POTASSIUM 875; 125 MG/1; MG/1
1 TABLET, FILM COATED ORAL EVERY 12 HOURS SCHEDULED
Qty: 14 TABLET | Refills: 0 | Status: SHIPPED | OUTPATIENT
Start: 2023-03-14 | End: 2023-03-21

## 2023-03-14 NOTE — PROGRESS NOTES
[address], 43 y o  male reports to clinic for pain in the UR quadrant  Pt consents to exam and understands limited scope of today's visit  Significant medical history: reviewed no significant medical history - advised pt to go to PCP due to maternal diabetes  Allergies: none  LDV: years ago  ASA: 1    Pain has been a 10/10  Pain began months ago  Pt took amoxicillin for pain prescribed by the ED twice after two separate visits to the ED for dental pain  Pt is alternating between advil and tylenol which has helped with the pain  Exam/findings:  - fractured #1     Radiographic findings:   - large caries and fracture to the pulp  - no PARL    Discussed:  - ext due to infection and broken tooth #1  - pt reports severe gag reflex - had a hard time with the xray   - advised pt we can try to take it out here but if too challenging we will send to [facility] - pt wanted to try here first    - gave referral to [facility] just in case they can see him faster    Treatment today:  - limited exam   - PA   - referral to [facility] - given to pt  - prescription Augmentin 875mg 2x per day 7 days     Pt left satisfied and ambulatory       NV: ext #1 either at [facility] or with [facility]   NNV: comp fmx

## 2023-04-03 ENCOUNTER — HOSPITAL ENCOUNTER (EMERGENCY)
Facility: HOSPITAL | Age: 43
Discharge: HOME | End: 2023-04-03
Attending: EMERGENCY MEDICINE

## 2023-04-03 VITALS
DIASTOLIC BLOOD PRESSURE: 102 MMHG | HEART RATE: 57 BPM | SYSTOLIC BLOOD PRESSURE: 205 MMHG | TEMPERATURE: 97 F | OXYGEN SATURATION: 96 % | RESPIRATION RATE: 18 BRPM

## 2023-04-03 DIAGNOSIS — K08.89 PAIN, DENTAL: Primary | ICD-10-CM

## 2023-04-03 RX ORDER — PENICILLIN V POTASSIUM 500 MG/1
500 TABLET ORAL 4 TIMES DAILY
Qty: 40 TABLET | Refills: 0 | Status: SHIPPED | OUTPATIENT
Start: 2023-04-03 | End: 2023-04-13

## 2023-04-03 RX ORDER — KETOROLAC TROMETHAMINE 30 MG/ML
60 INJECTION, SOLUTION INTRAMUSCULAR; INTRAVENOUS ONCE
Status: COMPLETED | OUTPATIENT
Start: 2023-04-03 | End: 2023-04-03

## 2023-04-03 RX ORDER — PENICILLIN V POTASSIUM 250 MG/1
500 TABLET ORAL ONCE
Status: COMPLETED | OUTPATIENT
Start: 2023-04-03 | End: 2023-04-03

## 2023-04-03 RX ORDER — IBUPROFEN 800 MG/1
800 TABLET ORAL EVERY 6 HOURS PRN
Qty: 30 TABLET | Refills: 0 | Status: SHIPPED | OUTPATIENT
Start: 2023-04-03

## 2023-04-03 RX ADMIN — KETOROLAC TROMETHAMINE 60 MG: 30 INJECTION, SOLUTION INTRAMUSCULAR; INTRAVENOUS at 21:52

## 2023-04-03 RX ADMIN — PENICILLIN V POTASSIUM 500 MG: 250 TABLET, FILM COATED ORAL at 21:54

## 2023-04-04 NOTE — ED PROVIDER NOTES
"History  Chief Complaint   Patient presents with   • Dental Pain     Patient c/o right back tooth pain starting \"a couple weeks ago\"  Patient states he has dentist appointment at the end of the month     Sent is a 49-year-old male  He has chronic right maxillary dental pain  He does have an appointment made up this month with a dentist for extraction  Is complaining of pain  No facial swelling  No fever or chills  No difficulty breathing or swallowing  Prior to Admission Medications   Prescriptions Last Dose Informant Patient Reported?  Taking?   chlorhexidine (PERIDEX) 0 12 % solution   No No   Sig: Apply 15 mL to the mouth or throat 2 (two) times a day   ibuprofen (MOTRIN) 600 mg tablet   No No   Sig: Take 1 tablet (600 mg total) by mouth every 6 (six) hours as needed for mild pain Take with food   naproxen (Naprosyn) 500 mg tablet   No No   Sig: Take 1 tablet (500 mg total) by mouth 3 (three) times a day with meals   ondansetron (Zofran ODT) 4 mg disintegrating tablet   No No   Sig: Take 1 tablet (4 mg total) by mouth every 12 (twelve) hours as needed for nausea or vomiting (Use as needed for nausea related to antibiotic use)   Patient not taking: Reported on 11/15/2022   oxyCODONE-acetaminophen (PERCOCET) 5-325 mg per tablet   No No   Sig: Take 1 tablet by mouth every 4 (four) hours as needed for moderate pain or severe pain Max Daily Amount: 6 tablets   Patient not taking: Reported on 11/15/2022      Facility-Administered Medications: None       Past Medical History:   Diagnosis Date   • Seizures ([address] )     as a child   • Sleep apnea        Past Surgical History:   Procedure Laterality Date   • NO PAST SURGERIES     • [place] RPR UMBILICAL HRNA 5 YRS/> REDUCIBLE N/A 1/7/2022    Procedure: REPAIR HERNIA UMBILICAL;  Surgeon: Elodia Amanda DO;  Location: [facility];  Service: General       Family History   Problem Relation Age of Onset   • Diabetes Mother    • Hypertension Mother    • No Known Problems " Father    • No Known Problems Sister    • No Known Problems Brother    • No Known Problems Sister      I have reviewed and agree with the history as documented  E-Cigarette/Vaping   • E-Cigarette Use Never User      E-Cigarette/Vaping Substances     Social History     Tobacco Use   • Smoking status: Every Day     Packs/day: 1 50     Types: Cigarettes   • Smokeless tobacco: Never   Vaping Use   • Vaping Use: Never used   Substance Use Topics   • Alcohol use: Not Currently   • Drug use: No       Review of Systems   Constitutional: Negative for chills and fever  HENT: Positive for dental problem  Negative for facial swelling, trouble swallowing and voice change  Respiratory: Negative for shortness of breath, wheezing and stridor  All other systems reviewed and are negative  Physical Exam  Physical Exam  Vitals reviewed  Constitutional:       General: He is not in acute distress  HENT:      Head: Normocephalic and atraumatic  Comments: No facial swelling  Nose: Nose normal       Mouth/Throat:      Mouth: Mucous membranes are moist       Comments: There is decay and tenderness to the right maxillary molars  There is no intraoral swelling  No tenderness, swelling or induration under the tongue or to the submandibular area  Eyes:      General:         Right eye: No discharge  Left eye: No discharge  Conjunctiva/sclera: Conjunctivae normal    Pulmonary:      Effort: Pulmonary effort is normal  No respiratory distress  Musculoskeletal:         General: No deformity or signs of injury  Cervical back: Normal range of motion and neck supple  Skin:     General: Skin is warm and dry  Coloration: Skin is not pale  Neurological:      General: No focal deficit present  Mental Status: He is alert and oriented to person, place, and time     Psychiatric:         Mood and Affect: Mood normal          Behavior: Behavior normal          Vital Signs  ED Triage Vitals [04/03/23 2037]   Temperature Pulse Respirations Blood Pressure SpO2   (!) 97 °F (36 1 °C) 57 18 (S) (!) 205/102 96 %      Temp Source Heart Rate Source Patient Position - Orthostatic VS BP Location FiO2 (%)   Temporal Monitor Sitting -- --      Pain Score       6           Vitals:    04/03/23 2037   BP: (S) (!) 205/102   Pulse: 57   Patient Position - Orthostatic VS: Sitting         Visual Acuity      ED Medications  Medications   penicillin V potassium (VEETID) tablet 500 mg (has no administration in time range)   ketorolac (TORADOL) injection 60 mg (60 mg Intramuscular Given 4/3/23 2152)       Diagnostic Studies  Results Reviewed     None                 No orders to display              Procedures  Procedures         ED Course                                             Medical Decision Making  No Hungewig's angina  Risk  Prescription drug management  Disposition  Final diagnoses:   Pain, dental     Time reflects when diagnosis was documented in both MDM as applicable and the Disposition within this note     Time User Action Codes Description Comment    4/3/2023  9:53 PM Annalisa Munoz Add [K08 89] Pain, dental       ED Disposition     ED Disposition   Discharge    Condition   Stable    Date/Time   Mon Apr 3, 2023  9:53 PM    [address] discharge to home/self care                 Follow-up Information     Follow up With Specialties Details Why Contact Info    Follow-up with dentist as scheduled, sooner if possible              Patient's Medications   Discharge Prescriptions    IBUPROFEN (MOTRIN) 800 MG TABLET    Take 1 tablet (800 mg total) by mouth every 6 (six) hours as needed (Pain)       Start Date: 4/3/2023  End Date: --       Order Dose: 800 mg       Quantity: 30 tablet    Refills: 0    PENICILLIN V POTASSIUM (VEETID) 500 MG TABLET    Take 1 tablet (500 mg total) by mouth 4 (four) times a day for 10 days       Start Date: 4/3/2023  End Date: 4/13/2023       Order Dose: 500 mg Quantity: 40 tablet    Refills: 0       No discharge procedures on file      PDMP Review     None          ED Provider  Electronically Signed by           Aline Wakefield MD  04/03/23 4267

## 2023-04-07 ENCOUNTER — TELEPHONE (OUTPATIENT)
Dept: DENTISTRY | Facility: CLINIC | Age: 43
End: 2023-04-07

## 2023-11-13 ENCOUNTER — OFFICE VISIT (OUTPATIENT)
Dept: URGENT CARE | Facility: MEDICAL CENTER | Age: 43
End: 2023-11-13
Payer: COMMERCIAL

## 2023-11-13 VITALS
HEART RATE: 85 BPM | OXYGEN SATURATION: 98 % | TEMPERATURE: 98.9 F | HEIGHT: 70 IN | RESPIRATION RATE: 18 BRPM | WEIGHT: 242 LBS | BODY MASS INDEX: 34.65 KG/M2 | SYSTOLIC BLOOD PRESSURE: 160 MMHG | DIASTOLIC BLOOD PRESSURE: 90 MMHG

## 2023-11-13 DIAGNOSIS — R50.9 FEVER, UNSPECIFIED FEVER CAUSE: Primary | ICD-10-CM

## 2023-11-13 DIAGNOSIS — R10.2 PELVIC PAIN: ICD-10-CM

## 2023-11-13 LAB
SL AMB  POCT GLUCOSE, UA: NORMAL
SL AMB LEUKOCYTE ESTERASE,UA: NORMAL
SL AMB POCT BILIRUBIN,UA: NORMAL
SL AMB POCT BLOOD,UA: NORMAL
SL AMB POCT CLARITY,UA: NORMAL
SL AMB POCT COLOR,UA: YELLOW
SL AMB POCT KETONES,UA: NORMAL
SL AMB POCT NITRITE,UA: NORMAL
SL AMB POCT PH,UA: 6.5
SL AMB POCT SPECIFIC GRAVITY,UA: 1
SL AMB POCT URINE PROTEIN: NORMAL
SL AMB POCT UROBILINOGEN: 0.2

## 2023-11-13 PROCEDURE — 87491 CHLMYD TRACH DNA AMP PROBE: CPT

## 2023-11-13 PROCEDURE — 87563 M. GENITALIUM AMP PROBE: CPT

## 2023-11-13 PROCEDURE — 87661 TRICHOMONAS VAGINALIS AMPLIF: CPT

## 2023-11-13 PROCEDURE — 81002 URINALYSIS NONAUTO W/O SCOPE: CPT

## 2023-11-13 PROCEDURE — 99212 OFFICE O/P EST SF 10 MIN: CPT

## 2023-11-13 PROCEDURE — 87086 URINE CULTURE/COLONY COUNT: CPT

## 2023-11-13 PROCEDURE — 87591 N.GONORRHOEAE DNA AMP PROB: CPT

## 2023-11-13 NOTE — PROGRESS NOTES
[facility]        NAME: Trent Whelan is a 37 y.o. male  : 1980    MRN: [id number]  DATE: 2023  TIME: 5:54 PM    Assessment and Plan   Fever, unspecified fever cause [R50.9]  1. Fever, unspecified fever cause  POCT urine dip    Chlamydia/GC amplified DNA by PCR    Trichomonas vaginalis/Mycoplasma genitalium PCR    Urine culture    Trichomonas vaginalis/Mycoplasma genitalium PCR    CANCELED: Trichomonas vaginalis/Mycoplasma genitalium PCR      2. Pelvic pain  Chlamydia/GC amplified DNA by PCR    Trichomonas vaginalis/Mycoplasma genitalium PCR    Urine culture    Trichomonas vaginalis/Mycoplasma genitalium PCR    CANCELED: Trichomonas vaginalis/Mycoplasma genitalium PCR            Patient Instructions       Follow up with PCP in 3-5 days. Proceed to  ER if symptoms worsen. Chief Complaint     Chief Complaint   Patient presents with   • Abdominal Pain     Abdominal pain started yesterday with body aches and chills         History of Present Illness       Patient here with chills and sweats. He has a slight cough. Having low abdominal pain for the past several days and reports it as "comes and goes". Last BM yesterday which was normal. Taking ibuprofen (this am 3 tablets) and Dayquil (last at 4pm). Denies any difficulty with urination. Patient in abdomen is located low pelvic area. Denies any hematuria. Reports having pain in lower back. Patient reports pain in his back comes and goes. He is not having the pain currently. Patient reports slight discomfort in his testicles. He has had sinus congestion, which is why he has been taking the dayquil. Sexually active, single female partner, denies any discharge from penis. In the case of thoroughness will do STI testing with patient consent. Review of Systems   Review of Systems   Constitutional:  Positive for chills and diaphoresis. Negative for fatigue and fever. HENT:  Positive for congestion, rhinorrhea and sinus pain. Negative for ear pain, sinus pressure, sore throat and trouble swallowing. Respiratory:  Negative for cough and shortness of breath. Cardiovascular:  Negative for chest pain and palpitations. Gastrointestinal:  Positive for abdominal pain. Negative for constipation, diarrhea, nausea and vomiting. Genitourinary:  Positive for flank pain and testicular pain. Negative for difficulty urinating, dysuria, hematuria, penile discharge, penile pain, penile swelling and scrotal swelling. "Discomfort" in testicles. Musculoskeletal:  Positive for back pain. Negative for arthralgias. Skin:  Negative for color change and rash. Neurological:  Negative for dizziness, light-headedness and headaches. All other systems reviewed and are negative. Current Medications     No current outpatient medications on file. Current Allergies     Allergies as of 11/13/2023   • (No Known Allergies)            The following portions of the patient's history were reviewed and updated as appropriate: allergies, current medications, past family history, past medical history, past social history, past surgical history and problem list.     Past Medical History:   Diagnosis Date   • Seizures ([address])     as a child   • Sleep apnea        Past Surgical History:   Procedure Laterality Date   • NO PAST SURGERIES     • [place] RPR UMBILICAL HRNA 5 YRS/> REDUCIBLE N/A 1/7/2022    Procedure: REPAIR HERNIA UMBILICAL;  Surgeon: Angeles Gtz DO;  Location: [facility];  Service: General       Family History   Problem Relation Age of Onset   • Diabetes Mother    • Hypertension Mother    • No Known Problems Father    • No Known Problems Sister    • No Known Problems Brother    • No Known Problems Sister          Medications have been verified.         Objective   /90   Pulse 85   Temp 98.9 °F (37.2 °C)   Resp 18   Ht 5' 10" (1.778 m)   Wt 110 kg (242 lb)   SpO2 98%   BMI 34.72 kg/m²        Physical Exam     Physical Exam  Vitals and nursing note reviewed. Constitutional:       General: He is awake. He is not in acute distress. Appearance: Normal appearance. He is normal weight. He is not ill-appearing. HENT:      Head: Normocephalic and atraumatic. Nose: Congestion and rhinorrhea present. Rhinorrhea is clear. Mouth/Throat:      Lips: Pink. Mouth: Mucous membranes are moist.      Pharynx: Oropharynx is clear. Eyes:      Extraocular Movements: Extraocular movements intact. Conjunctiva/sclera: Conjunctivae normal.      Pupils: Pupils are equal, round, and reactive to light. Cardiovascular:      Rate and Rhythm: Normal rate and regular rhythm. Pulses: Normal pulses. Heart sounds: Normal heart sounds, S1 normal and S2 normal. No murmur heard. Pulmonary:      Effort: Pulmonary effort is normal.      Breath sounds: Normal breath sounds. Abdominal:      General: Abdomen is protuberant. Bowel sounds are normal.      Palpations: Abdomen is soft. Tenderness: There is abdominal tenderness in the suprapubic area. Genitourinary:     Penis: Normal. No tenderness, discharge or swelling. Testes: Normal.         Right: Tenderness not present. Left: Tenderness not present. Musculoskeletal:         General: Normal range of motion. Cervical back: Normal range of motion and neck supple. Skin:     General: Skin is warm. Capillary Refill: Capillary refill takes less than 2 seconds. Neurological:      General: No focal deficit present. Mental Status: He is alert and oriented to person, place, and time. Psychiatric:         Mood and Affect: Mood normal.         Behavior: Behavior normal. Behavior is cooperative.

## 2023-11-14 LAB
BACTERIA UR CULT: ABNORMAL
C TRACH DNA SPEC QL NAA+PROBE: NEGATIVE
M GENITALIUM DNA SPEC QL NAA+PROBE: NEGATIVE
N GONORRHOEA DNA SPEC QL NAA+PROBE: NEGATIVE
T VAGINALIS DNA SPEC QL NAA+PROBE: NEGATIVE

## 2023-11-15 ENCOUNTER — TELEPHONE (OUTPATIENT)
Dept: URGENT CARE | Facility: MEDICAL CENTER | Age: 43
End: 2023-11-15

## 2023-11-15 DIAGNOSIS — N39.0 URINARY TRACT INFECTION WITHOUT HEMATURIA, SITE UNSPECIFIED: Primary | ICD-10-CM

## 2023-11-15 RX ORDER — SULFAMETHOXAZOLE AND TRIMETHOPRIM 800; 160 MG/1; MG/1
1 TABLET ORAL EVERY 12 HOURS SCHEDULED
Qty: 14 TABLET | Refills: 0 | Status: SHIPPED | OUTPATIENT
Start: 2023-11-15 | End: 2023-11-22

## 2023-11-15 NOTE — TELEPHONE ENCOUNTER
Called and spoke with patient regarding urine results. Still not feeling well. Continues to have some back pain and also chills. Will start on Abx, confirmed NKDA and pharmacy.

## (undated) DEVICE — BULB SYRINGE,IRRIGATION WITH PROTECTIVE CAP: Brand: DOVER

## (undated) DEVICE — GLOVE SRG BIOGEL ECLIPSE 7

## (undated) DEVICE — BETHLEHEM UNIVERSAL MINOR GEN: Brand: CARDINAL HEALTH

## (undated) DEVICE — INTENDED FOR TISSUE SEPARATION, AND OTHER PROCEDURES THAT REQUIRE A SHARP SURGICAL BLADE TO PUNCTURE OR CUT.: Brand: BARD-PARKER SAFETY BLADES SIZE 15, STERILE

## (undated) DEVICE — PLUMEPEN PRO 10FT

## (undated) DEVICE — SUT VICRYL 2-0 SH 27 IN UNDYED J417H

## (undated) DEVICE — DRAPE EQUIPMENT RF WAND

## (undated) DEVICE — SUT PDS II 0 CT-1 36 IN Z346H

## (undated) DEVICE — SYRINGE 10ML LL

## (undated) DEVICE — NEEDLE 25G X 1 1/2

## (undated) DEVICE — 3M™ STERI-STRIP™ REINFORCED ADHESIVE SKIN CLOSURES, R1546, 1/4 IN X 4 IN (6 MM X 100 MM), 10 STRIPS/ENVELOPE: Brand: 3M™ STERI-STRIP™

## (undated) DEVICE — SWABSTCK, BENZOIN TINCTURE, 1/PK, STRL: Brand: APLICARE

## (undated) DEVICE — CHLORAPREP HI-LITE 26ML ORANGE

## (undated) DEVICE — PENROSE DRAIN, 18 X 3 8: Brand: CARDINAL HEALTH

## (undated) DEVICE — MEDI-VAC YANK SUCT HNDL W/TPRD BULBOUS TIP: Brand: CARDINAL HEALTH

## (undated) DEVICE — SUT MONOCRYL 4-0 PS-2 27 IN Y426H

## (undated) DEVICE — GAUZE SPONGES,16 PLY: Brand: CURITY

## (undated) DEVICE — BINDER ABDOMINAL 46-62 IN

## (undated) DEVICE — TUBING SUCTION 5MM X 12 FT

## (undated) DEVICE — ALL PURPOSE SPONGES,NON-WOVEN, 4 PLY: Brand: CURITY

## (undated) DEVICE — PAD GROUNDING ADULT

## (undated) DEVICE — 3M™ TEGADERM™ TRANSPARENT FILM DRESSING FRAME STYLE, 1626W, 4 IN X 4-3/4 IN (10 CM X 12 CM), 50/CT 4CT/CASE: Brand: 3M™ TEGADERM™

## (undated) DEVICE — SUT PDS II 1 CT-1 27 IN Z347H

## (undated) DEVICE — SUT VICRYL 3-0 SH 27 IN J416H

## (undated) DEVICE — GLOVE INDICATOR PI UNDERGLOVE SZ 7 BLUE